# Patient Record
Sex: MALE | Race: WHITE | NOT HISPANIC OR LATINO
[De-identification: names, ages, dates, MRNs, and addresses within clinical notes are randomized per-mention and may not be internally consistent; named-entity substitution may affect disease eponyms.]

---

## 2018-07-11 ENCOUNTER — RECORD ABSTRACTING (OUTPATIENT)
Age: 58
End: 2018-07-11

## 2018-07-11 DIAGNOSIS — K58.9 IRRITABLE BOWEL SYNDROME W/OUT DIARRHEA: ICD-10-CM

## 2018-07-11 RX ORDER — ALLOPURINOL 300 MG/1
300 TABLET ORAL
Refills: 0 | Status: ACTIVE | COMMUNITY

## 2018-07-29 ENCOUNTER — RESULT CHARGE (OUTPATIENT)
Age: 58
End: 2018-07-29

## 2018-07-30 ENCOUNTER — LABORATORY RESULT (OUTPATIENT)
Age: 58
End: 2018-07-30

## 2018-07-30 ENCOUNTER — APPOINTMENT (OUTPATIENT)
Dept: PULMONOLOGY | Facility: CLINIC | Age: 58
End: 2018-07-30
Payer: COMMERCIAL

## 2018-07-30 ENCOUNTER — NON-APPOINTMENT (OUTPATIENT)
Age: 58
End: 2018-07-30

## 2018-07-30 VITALS
BODY MASS INDEX: 40.43 KG/M2 | RESPIRATION RATE: 17 BRPM | OXYGEN SATURATION: 95 % | SYSTOLIC BLOOD PRESSURE: 128 MMHG | HEIGHT: 74 IN | HEART RATE: 85 BPM | DIASTOLIC BLOOD PRESSURE: 84 MMHG | WEIGHT: 315 LBS

## 2018-07-30 DIAGNOSIS — M19.041 PRIMARY OSTEOARTHRITIS, RIGHT HAND: ICD-10-CM

## 2018-07-30 DIAGNOSIS — M19.042 PRIMARY OSTEOARTHRITIS, LEFT HAND: ICD-10-CM

## 2018-07-30 LAB
ALBUMIN: 30
BILIRUB UR QL STRIP: NORMAL
CLARITY UR: CLEAR
COLLECTION METHOD: NORMAL
CREATININE: 30
GLUCOSE UR-MCNC: NEGATIVE
HCG UR QL: 0.2 EU/DL
HGB UR QL STRIP.AUTO: NEGATIVE
KETONES UR-MCNC: NEGATIVE
LEUKOCYTE ESTERASE UR QL STRIP: NEGATIVE
MICROALBUMIN/CREAT UR TEST STR-RTO: 300
NITRITE UR QL STRIP: NEGATIVE
PH UR STRIP: 5.5
POCT - HEMOGLOBIN (HGB), QUANTITATIVE, TRANSCUTANEOUS: 15.3
PROT UR STRIP-MCNC: 30
SP GR UR STRIP: 1.03

## 2018-07-30 PROCEDURE — 99396 PREV VISIT EST AGE 40-64: CPT | Mod: 25

## 2018-07-30 PROCEDURE — 94250: CPT

## 2018-07-30 PROCEDURE — 90750 HZV VACC RECOMBINANT IM: CPT | Mod: GY

## 2018-07-30 PROCEDURE — 94727 GAS DIL/WSHOT DETER LNG VOL: CPT

## 2018-07-30 PROCEDURE — 88738 HGB QUANT TRANSCUTANEOUS: CPT

## 2018-07-30 PROCEDURE — 90471 IMMUNIZATION ADMIN: CPT | Mod: GY

## 2018-07-30 PROCEDURE — 94729 DIFFUSING CAPACITY: CPT

## 2018-07-30 PROCEDURE — 81003 URINALYSIS AUTO W/O SCOPE: CPT | Mod: QW

## 2018-07-30 PROCEDURE — 82044 UR ALBUMIN SEMIQUANTITATIVE: CPT | Mod: QW

## 2018-08-01 LAB
25(OH)D3 SERPL-MCNC: 25.9 NG/ML
ALBUMIN SERPL ELPH-MCNC: 4.6 G/DL
ALP BLD-CCNC: 67 U/L
ALT SERPL-CCNC: 51 U/L
ANION GAP SERPL CALC-SCNC: 14 MMOL/L
AST SERPL-CCNC: 26 U/L
BASOPHILS # BLD AUTO: 0.01 K/UL
BASOPHILS NFR BLD AUTO: 0.2 %
BILIRUB DIRECT SERPL-MCNC: 0.1 MG/DL
BILIRUB INDIRECT SERPL-MCNC: 0.3 MG/DL
BILIRUB SERPL-MCNC: 0.4 MG/DL
BUN SERPL-MCNC: 14 MG/DL
CALCIUM SERPL-MCNC: 9.5 MG/DL
CHLORIDE SERPL-SCNC: 104 MMOL/L
CHOLEST SERPL-MCNC: 191 MG/DL
CHOLEST/HDLC SERPL: 4.1 RATIO
CO2 SERPL-SCNC: 25 MMOL/L
CREAT SERPL-MCNC: 0.79 MG/DL
EOSINOPHIL # BLD AUTO: 0.15 K/UL
EOSINOPHIL NFR BLD AUTO: 2.7 %
GLUCOSE SERPL-MCNC: 99 MG/DL
HBA1C MFR BLD HPLC: 5.5 %
HCT VFR BLD CALC: 44.8 %
HCV AB SER QL: NONREACTIVE
HCV S/CO RATIO: 0.22 S/CO
HDLC SERPL-MCNC: 47 MG/DL
HGB BLD-MCNC: 14.4 G/DL
IMM GRANULOCYTES NFR BLD AUTO: 0.4 %
LDLC SERPL CALC-MCNC: 105 MG/DL
LYMPHOCYTES # BLD AUTO: 1.79 K/UL
LYMPHOCYTES NFR BLD AUTO: 31.9 %
MAN DIFF?: NORMAL
MCHC RBC-ENTMCNC: 31.9 PG
MCHC RBC-ENTMCNC: 32.1 GM/DL
MCV RBC AUTO: 99.3 FL
MONOCYTES # BLD AUTO: 0.38 K/UL
MONOCYTES NFR BLD AUTO: 6.8 %
NEUTROPHILS # BLD AUTO: 3.27 K/UL
NEUTROPHILS NFR BLD AUTO: 58 %
PLATELET # BLD AUTO: 222 K/UL
POTASSIUM SERPL-SCNC: 4.3 MMOL/L
PROT SERPL-MCNC: 7 G/DL
RBC # BLD: 4.51 M/UL
RBC # FLD: 13.9 %
SODIUM SERPL-SCNC: 143 MMOL/L
T3 SERPL-MCNC: 90 NG/DL
T3RU NFR SERPL: 0.97 INDEX
T4 FREE SERPL-MCNC: 1.7 NG/DL
T4 SERPL-MCNC: 8.3 UG/DL
TRIGL SERPL-MCNC: 194 MG/DL
TSH SERPL-ACNC: 0.94 UIU/ML
URATE SERPL-MCNC: 5.4 MG/DL
WBC # FLD AUTO: 5.62 K/UL

## 2018-09-28 ENCOUNTER — RX RENEWAL (OUTPATIENT)
Age: 58
End: 2018-09-28

## 2018-09-28 ENCOUNTER — RX CHANGE (OUTPATIENT)
Age: 58
End: 2018-09-28

## 2018-11-24 ENCOUNTER — RECORD ABSTRACTING (OUTPATIENT)
Age: 58
End: 2018-11-24

## 2018-11-26 ENCOUNTER — APPOINTMENT (OUTPATIENT)
Dept: PULMONOLOGY | Facility: CLINIC | Age: 58
End: 2018-11-26
Payer: COMMERCIAL

## 2018-11-26 VITALS
DIASTOLIC BLOOD PRESSURE: 90 MMHG | OXYGEN SATURATION: 95 % | RESPIRATION RATE: 12 BRPM | WEIGHT: 310 LBS | HEART RATE: 88 BPM | HEIGHT: 74 IN | TEMPERATURE: 98.1 F | BODY MASS INDEX: 39.78 KG/M2 | SYSTOLIC BLOOD PRESSURE: 136 MMHG

## 2018-11-26 VITALS — DIASTOLIC BLOOD PRESSURE: 86 MMHG | SYSTOLIC BLOOD PRESSURE: 120 MMHG

## 2018-11-26 PROCEDURE — G0008: CPT

## 2018-11-26 PROCEDURE — 90686 IIV4 VACC NO PRSV 0.5 ML IM: CPT

## 2018-11-26 PROCEDURE — 99214 OFFICE O/P EST MOD 30 MIN: CPT | Mod: 25

## 2019-01-21 ENCOUNTER — APPOINTMENT (OUTPATIENT)
Dept: PULMONOLOGY | Facility: CLINIC | Age: 59
End: 2019-01-21
Payer: COMMERCIAL

## 2019-01-21 VITALS — OXYGEN SATURATION: 95 % | HEART RATE: 92 BPM | SYSTOLIC BLOOD PRESSURE: 131 MMHG | DIASTOLIC BLOOD PRESSURE: 86 MMHG

## 2019-01-21 PROCEDURE — 90471 IMMUNIZATION ADMIN: CPT

## 2019-01-21 PROCEDURE — 90750 HZV VACC RECOMBINANT IM: CPT

## 2019-01-21 PROCEDURE — 99213 OFFICE O/P EST LOW 20 MIN: CPT | Mod: 25

## 2019-01-21 NOTE — HISTORY OF PRESENT ILLNESS
[FreeTextEntry1] : saw rheum for pain top of feet knees and, placed on Gabapentin and Mobic and see neurologist

## 2019-01-21 NOTE — REVIEW OF SYSTEMS
[Hypertension] : ~T hypertension [Back Pain] : ~T back pain [As Noted in HPI] : as noted in HPI [Negative] : Gastrointestinal [Fatigue] : no fatigue [FreeTextEntry6] : foot pain

## 2019-01-21 NOTE — PHYSICAL EXAM
[General Appearance - Well Developed] : well developed [General Appearance - Well Nourished] : well nourished [Normal Oropharynx] : normal oropharynx [Neck Appearance] : the appearance of the neck was normal [Heart Sounds] : normal S1 and S2 [Edema] : no peripheral edema present [Auscultation Breath Sounds / Voice Sounds] : lungs were clear to auscultation bilaterally [Abdomen Soft] : soft [Abdomen Tenderness] : non-tender [Abdomen Mass (___ Cm)] : no abdominal mass palpated [Nail Clubbing] : no clubbing of the fingernails [Cyanosis, Localized] : no localized cyanosis [Petechial Hemorrhages (___cm)] : no petechial hemorrhages [Skin Color & Pigmentation] : normal skin color and pigmentation [] : no rash [No Venous Stasis] : no venous stasis [Skin Lesions] : no skin lesions [No Skin Ulcers] : no skin ulcer [No Xanthoma] : no  xanthoma was observed [Deep Tendon Reflexes (DTR)] : deep tendon reflexes were 2+ and symmetric [Sensation] : the sensory exam was normal to light touch and pinprick [No Focal Deficits] : no focal deficits [Oriented To Time, Place, And Person] : oriented to person, place, and time [Impaired Insight] : insight and judgment were intact [Affect] : the affect was normal

## 2019-02-19 ENCOUNTER — RX RENEWAL (OUTPATIENT)
Age: 59
End: 2019-02-19

## 2019-04-15 ENCOUNTER — APPOINTMENT (OUTPATIENT)
Dept: PULMONOLOGY | Facility: CLINIC | Age: 59
End: 2019-04-15
Payer: COMMERCIAL

## 2019-04-15 VITALS
BODY MASS INDEX: 39.78 KG/M2 | HEIGHT: 74 IN | HEART RATE: 90 BPM | RESPIRATION RATE: 16 BRPM | WEIGHT: 310 LBS | OXYGEN SATURATION: 95 % | DIASTOLIC BLOOD PRESSURE: 84 MMHG | SYSTOLIC BLOOD PRESSURE: 126 MMHG

## 2019-04-15 PROCEDURE — 36415 COLL VENOUS BLD VENIPUNCTURE: CPT

## 2019-04-15 PROCEDURE — 99213 OFFICE O/P EST LOW 20 MIN: CPT | Mod: 25

## 2019-04-15 NOTE — PHYSICAL EXAM
[General Appearance - Well Developed] : well developed [General Appearance - Well Nourished] : well nourished [Normal Oropharynx] : normal oropharynx [Neck Appearance] : the appearance of the neck was normal [Heart Sounds] : normal S1 and S2 [Edema] : no peripheral edema present [Auscultation Breath Sounds / Voice Sounds] : lungs were clear to auscultation bilaterally [Abdomen Soft] : soft [Abdomen Tenderness] : non-tender [Abdomen Mass (___ Cm)] : no abdominal mass palpated [Nail Clubbing] : no clubbing of the fingernails [Cyanosis, Localized] : no localized cyanosis [Petechial Hemorrhages (___cm)] : no petechial hemorrhages [Skin Color & Pigmentation] : normal skin color and pigmentation [] : no rash [No Venous Stasis] : no venous stasis [Skin Lesions] : no skin lesions [No Xanthoma] : no  xanthoma was observed [No Skin Ulcers] : no skin ulcer [Deep Tendon Reflexes (DTR)] : deep tendon reflexes were 2+ and symmetric [Sensation] : the sensory exam was normal to light touch and pinprick [No Focal Deficits] : no focal deficits [Oriented To Time, Place, And Person] : oriented to person, place, and time [Impaired Insight] : insight and judgment were intact [Affect] : the affect was normal

## 2019-04-16 LAB
ALBUMIN SERPL ELPH-MCNC: 4.7 G/DL
ALP BLD-CCNC: 72 U/L
ALT SERPL-CCNC: 39 U/L
AST SERPL-CCNC: 23 U/L
BILIRUB DIRECT SERPL-MCNC: 0.1 MG/DL
BILIRUB INDIRECT SERPL-MCNC: 0.4 MG/DL
BILIRUB SERPL-MCNC: 0.5 MG/DL
CHOLEST SERPL-MCNC: 192 MG/DL
CHOLEST/HDLC SERPL: 3.9 RATIO
HDLC SERPL-MCNC: 49 MG/DL
LDLC SERPL CALC-MCNC: 97 MG/DL
PROT SERPL-MCNC: 7.2 G/DL
TRIGL SERPL-MCNC: 231 MG/DL

## 2019-06-17 ENCOUNTER — RX RENEWAL (OUTPATIENT)
Age: 59
End: 2019-06-17

## 2019-07-22 ENCOUNTER — APPOINTMENT (OUTPATIENT)
Dept: PULMONOLOGY | Facility: CLINIC | Age: 59
End: 2019-07-22

## 2019-08-12 ENCOUNTER — NON-APPOINTMENT (OUTPATIENT)
Age: 59
End: 2019-08-12

## 2019-08-12 ENCOUNTER — APPOINTMENT (OUTPATIENT)
Dept: PULMONOLOGY | Facility: CLINIC | Age: 59
End: 2019-08-12
Payer: COMMERCIAL

## 2019-08-12 ENCOUNTER — LABORATORY RESULT (OUTPATIENT)
Age: 59
End: 2019-08-12

## 2019-08-12 VITALS — DIASTOLIC BLOOD PRESSURE: 82 MMHG | SYSTOLIC BLOOD PRESSURE: 130 MMHG

## 2019-08-12 VITALS
HEIGHT: 74 IN | RESPIRATION RATE: 16 BRPM | SYSTOLIC BLOOD PRESSURE: 136 MMHG | OXYGEN SATURATION: 95 % | DIASTOLIC BLOOD PRESSURE: 90 MMHG | BODY MASS INDEX: 40.43 KG/M2 | TEMPERATURE: 98.8 F | HEART RATE: 93 BPM | WEIGHT: 315 LBS

## 2019-08-12 DIAGNOSIS — Z11.59 ENCOUNTER FOR SCREENING FOR OTHER VIRAL DISEASES: ICD-10-CM

## 2019-08-12 DIAGNOSIS — M19.90 UNSPECIFIED OSTEOARTHRITIS, UNSPECIFIED SITE: ICD-10-CM

## 2019-08-12 LAB
ALBUMIN: 30
BILIRUB UR QL STRIP: NEGATIVE
CLARITY UR: CLEAR
COLLECTION METHOD: NORMAL
CREATININE: 300
GLUCOSE UR-MCNC: NEGATIVE
HCG UR QL: 0.2 EU/DL
HGB UR QL STRIP.AUTO: NEGATIVE
KETONES UR-MCNC: NORMAL
LEUKOCYTE ESTERASE UR QL STRIP: NEGATIVE
MICROALBUMIN/CREAT UR TEST STR-RTO: 30
NITRITE UR QL STRIP: NEGATIVE
PH UR STRIP: 5
PROT UR STRIP-MCNC: NEGATIVE
SP GR UR STRIP: 1.03

## 2019-08-12 PROCEDURE — 93000 ELECTROCARDIOGRAM COMPLETE: CPT

## 2019-08-12 PROCEDURE — 82044 UR ALBUMIN SEMIQUANTITATIVE: CPT | Mod: QW

## 2019-08-12 PROCEDURE — 99396 PREV VISIT EST AGE 40-64: CPT | Mod: 25

## 2019-08-12 PROCEDURE — 94727 GAS DIL/WSHOT DETER LNG VOL: CPT

## 2019-08-12 PROCEDURE — 36415 COLL VENOUS BLD VENIPUNCTURE: CPT

## 2019-08-12 PROCEDURE — 94729 DIFFUSING CAPACITY: CPT

## 2019-08-12 PROCEDURE — 81003 URINALYSIS AUTO W/O SCOPE: CPT | Mod: QW

## 2019-08-12 PROCEDURE — 94060 EVALUATION OF WHEEZING: CPT

## 2019-08-12 RX ORDER — CHOLESTYRAMINE 4 G/5.5G
4 POWDER, FOR SUSPENSION ORAL
Refills: 0 | Status: DISCONTINUED | COMMUNITY
End: 2019-08-12

## 2019-08-12 RX ORDER — AMLODIPINE BESYLATE 5 MG/1
5 TABLET ORAL DAILY
Refills: 0 | Status: DISCONTINUED | COMMUNITY
End: 2019-08-12

## 2019-08-12 RX ORDER — MELOXICAM 15 MG/1
15 TABLET ORAL
Qty: 30 | Refills: 0 | Status: DISCONTINUED | COMMUNITY
Start: 2019-01-12 | End: 2019-08-12

## 2019-08-12 NOTE — ASSESSMENT
[FreeTextEntry1] : Weight loss recommended and discussed.\par Also will help with arthritis and hopefully decrease in NSAIA\par Labs drawn in office today\par Medications reviewed and renewed.\par

## 2019-08-12 NOTE — DISCUSSION/SUMMARY
[FreeTextEntry1] : Overweight for diet/exercise\par Asthma meets goals. Denies significant nocturnal symptoms. Rare beta agonist use. Denies significant cough, wheezing, SOB.\par Osteoarthritis seeing ortho\par Renal Stones seeing  also for prostate Lummerman\par HTN controlled\par Hyperlipidemia

## 2019-08-12 NOTE — PROCEDURE
[FreeTextEntry1] : Venipuncture for labs\par \par Pulmonary function testing.\par These data demonstrate a mild obstructive ventilatory deficit. There is no significant bronchodilator response. Normal Lung Volumes. Diffusion capacity is normal. \par

## 2019-08-12 NOTE — PHYSICAL EXAM
[General Appearance - Well Developed] : well developed [General Appearance - Well Nourished] : well nourished [Normal Oropharynx] : normal oropharynx [Neck Appearance] : the appearance of the neck was normal [Heart Sounds] : normal S1 and S2 [Edema] : no peripheral edema present [Auscultation Breath Sounds / Voice Sounds] : lungs were clear to auscultation bilaterally [Abdomen Soft] : soft [Abdomen Tenderness] : non-tender [Abdomen Mass (___ Cm)] : no abdominal mass palpated [Nail Clubbing] : no clubbing of the fingernails [Cyanosis, Localized] : no localized cyanosis [Petechial Hemorrhages (___cm)] : no petechial hemorrhages [Skin Color & Pigmentation] : normal skin color and pigmentation [] : no rash [No Venous Stasis] : no venous stasis [Skin Lesions] : no skin lesions [No Skin Ulcers] : no skin ulcer [No Xanthoma] : no  xanthoma was observed [Deep Tendon Reflexes (DTR)] : deep tendon reflexes were 2+ and symmetric [Sensation] : the sensory exam was normal to light touch and pinprick [No Focal Deficits] : no focal deficits [Oriented To Time, Place, And Person] : oriented to person, place, and time [Impaired Insight] : insight and judgment were intact [Affect] : the affect was normal [FreeTextEntry1] : Overweight

## 2019-08-12 NOTE — HISTORY OF PRESENT ILLNESS
[FreeTextEntry1] : Colonoscopy 2 year ago\par Optho yearly\par Derm early\par \par Biggest c/ is the arthritis, seeing back orthopedic and may need sx, had shots in spine. Told to lose weight.Has gained weight

## 2019-08-13 LAB
25(OH)D3 SERPL-MCNC: 35.4 NG/ML
ALBUMIN SERPL ELPH-MCNC: 4.6 G/DL
ALP BLD-CCNC: 67 U/L
ALT SERPL-CCNC: 42 U/L
ANION GAP SERPL CALC-SCNC: 20 MMOL/L
AST SERPL-CCNC: 27 U/L
BASOPHILS # BLD AUTO: 0.02 K/UL
BASOPHILS NFR BLD AUTO: 0.4 %
BILIRUB DIRECT SERPL-MCNC: 0.1 MG/DL
BILIRUB INDIRECT SERPL-MCNC: 0.3 MG/DL
BILIRUB SERPL-MCNC: 0.4 MG/DL
BUN SERPL-MCNC: 14 MG/DL
CALCIUM SERPL-MCNC: 10 MG/DL
CHLORIDE SERPL-SCNC: 103 MMOL/L
CHOLEST SERPL-MCNC: 201 MG/DL
CHOLEST/HDLC SERPL: 3.7 RATIO
CO2 SERPL-SCNC: 22 MMOL/L
CREAT SERPL-MCNC: 0.96 MG/DL
EOSINOPHIL # BLD AUTO: 0.15 K/UL
EOSINOPHIL NFR BLD AUTO: 2.8 %
ESTIMATED AVERAGE GLUCOSE: 108 MG/DL
GLUCOSE SERPL-MCNC: 106 MG/DL
HBA1C MFR BLD HPLC: 5.4 %
HCT VFR BLD CALC: 45.3 %
HCV AB SER QL: NONREACTIVE
HCV S/CO RATIO: 0.13 S/CO
HDLC SERPL-MCNC: 54 MG/DL
HGB BLD-MCNC: 15 G/DL
IMM GRANULOCYTES NFR BLD AUTO: 0.4 %
LDLC SERPL CALC-MCNC: 112 MG/DL
LYMPHOCYTES # BLD AUTO: 1.6 K/UL
LYMPHOCYTES NFR BLD AUTO: 30.1 %
MAN DIFF?: NORMAL
MCHC RBC-ENTMCNC: 32.3 PG
MCHC RBC-ENTMCNC: 33.1 GM/DL
MCV RBC AUTO: 97.6 FL
MONOCYTES # BLD AUTO: 0.42 K/UL
MONOCYTES NFR BLD AUTO: 7.9 %
NEUTROPHILS # BLD AUTO: 3.1 K/UL
NEUTROPHILS NFR BLD AUTO: 58.4 %
PLATELET # BLD AUTO: 244 K/UL
POTASSIUM SERPL-SCNC: 4.8 MMOL/L
PROT SERPL-MCNC: 7.4 G/DL
PSA SERPL-MCNC: 1.61 NG/ML
RBC # BLD: 4.64 M/UL
RBC # FLD: 13.1 %
SODIUM SERPL-SCNC: 144 MMOL/L
T3 SERPL-MCNC: 95 NG/DL
T3RU NFR SERPL: 1 TBI
T4 FREE SERPL-MCNC: 1.6 NG/DL
T4 SERPL-MCNC: 7.5 UG/DL
TRIGL SERPL-MCNC: 177 MG/DL
TSH SERPL-ACNC: 1.18 UIU/ML
WBC # FLD AUTO: 5.31 K/UL

## 2020-02-18 ENCOUNTER — RX RENEWAL (OUTPATIENT)
Age: 60
End: 2020-02-18

## 2020-03-16 ENCOUNTER — APPOINTMENT (OUTPATIENT)
Dept: PULMONOLOGY | Facility: CLINIC | Age: 60
End: 2020-03-16
Payer: COMMERCIAL

## 2020-03-16 VITALS
DIASTOLIC BLOOD PRESSURE: 79 MMHG | RESPIRATION RATE: 16 BRPM | OXYGEN SATURATION: 95 % | TEMPERATURE: 98.1 F | HEART RATE: 93 BPM | SYSTOLIC BLOOD PRESSURE: 138 MMHG

## 2020-03-16 VITALS — DIASTOLIC BLOOD PRESSURE: 78 MMHG | SYSTOLIC BLOOD PRESSURE: 134 MMHG

## 2020-03-16 PROCEDURE — 99213 OFFICE O/P EST LOW 20 MIN: CPT

## 2020-03-16 NOTE — ASSESSMENT
[FreeTextEntry1] : Weight loss recommended and discussed.\par Obtain labs\par Medications reviewed and renewed.\par

## 2020-03-16 NOTE — DISCUSSION/SUMMARY
[FreeTextEntry1] : Overweight continue diet/exercise\par Asthma meets goals. Denies significant nocturnal symptoms. Rare beta agonist use. Denies significant cough, wheezing, SOB.\par Osteoarthritis seeing ortho\par Renal Stones seeing  also for prostate Lummerman\par HTN controlled\par Hyperlipidemia

## 2020-03-16 NOTE — PHYSICAL EXAM
[No Acute Distress] : no acute distress [Normal Oropharynx] : normal oropharynx [Supple] : supple [No JVD] : no jvd [Normal S1, S2] : normal s1, s2 [No Murmurs] : no murmurs [Clear to Auscultation Bilaterally] : clear to auscultation bilaterally [Normal to Percussion] : normal to percussion [Benign] : benign [No HSM] : no hsm [No Clubbing] : no clubbing [No Cyanosis] : no cyanosis [No Edema] : no edema [Oriented x3] : oriented x3 [Normal Affect] : normal affect

## 2020-06-08 ENCOUNTER — RX RENEWAL (OUTPATIENT)
Age: 60
End: 2020-06-08

## 2020-06-17 ENCOUNTER — NON-APPOINTMENT (OUTPATIENT)
Age: 60
End: 2020-06-17

## 2020-06-17 ENCOUNTER — APPOINTMENT (OUTPATIENT)
Dept: PULMONOLOGY | Facility: CLINIC | Age: 60
End: 2020-06-17
Payer: COMMERCIAL

## 2020-06-17 VITALS
TEMPERATURE: 98.5 F | OXYGEN SATURATION: 96 % | SYSTOLIC BLOOD PRESSURE: 146 MMHG | HEART RATE: 92 BPM | RESPIRATION RATE: 16 BRPM | DIASTOLIC BLOOD PRESSURE: 83 MMHG

## 2020-06-17 DIAGNOSIS — M17.10 UNILATERAL PRIMARY OSTEOARTHRITIS, UNSPECIFIED KNEE: ICD-10-CM

## 2020-06-17 PROCEDURE — 99214 OFFICE O/P EST MOD 30 MIN: CPT | Mod: 25

## 2020-06-17 PROCEDURE — 36415 COLL VENOUS BLD VENIPUNCTURE: CPT

## 2020-06-17 PROCEDURE — 93000 ELECTROCARDIOGRAM COMPLETE: CPT

## 2020-06-17 RX ORDER — RANITIDINE HYDROCHLORIDE 150 MG/1
150 CAPSULE ORAL
Refills: 0 | Status: DISCONTINUED | COMMUNITY
Start: 2018-07-30 | End: 2020-06-17

## 2020-06-17 RX ORDER — GABAPENTIN 300 MG/1
300 CAPSULE ORAL
Qty: 90 | Refills: 0 | Status: DISCONTINUED | COMMUNITY
Start: 2019-01-12 | End: 2020-06-17

## 2020-06-17 RX ORDER — FAMOTIDINE 20 MG/1
20 TABLET, FILM COATED ORAL
Refills: 0 | Status: ACTIVE | COMMUNITY

## 2020-06-17 NOTE — PHYSICAL EXAM
[No Acute Distress] : no acute distress [Normal Oropharynx] : normal oropharynx [No JVD] : no jvd [Supple] : supple [Normal S1, S2] : normal s1, s2 [Normal to Percussion] : normal to percussion [No Murmurs] : no murmurs [Clear to Auscultation Bilaterally] : clear to auscultation bilaterally [Benign] : benign [No HSM] : no hsm [No Cyanosis] : no cyanosis [No Clubbing] : no clubbing [No Edema] : no edema [Normal Affect] : normal affect [Oriented x3] : oriented x3

## 2020-06-18 LAB
ALBUMIN SERPL ELPH-MCNC: 4.7 G/DL
ALP BLD-CCNC: 60 U/L
ALT SERPL-CCNC: 43 U/L
ANION GAP SERPL CALC-SCNC: 13 MMOL/L
APPEARANCE: CLEAR
APTT BLD: 29 SEC
AST SERPL-CCNC: 24 U/L
BACTERIA: NEGATIVE
BASOPHILS # BLD AUTO: 0.03 K/UL
BASOPHILS NFR BLD AUTO: 0.5 %
BILIRUB SERPL-MCNC: 0.5 MG/DL
BILIRUBIN URINE: NEGATIVE
BLOOD URINE: NEGATIVE
BUN SERPL-MCNC: 17 MG/DL
CALCIUM SERPL-MCNC: 9.6 MG/DL
CHLORIDE SERPL-SCNC: 100 MMOL/L
CO2 SERPL-SCNC: 25 MMOL/L
COLOR: NORMAL
CREAT SERPL-MCNC: 0.85 MG/DL
CREAT SPEC-SCNC: 184 MG/DL
EOSINOPHIL # BLD AUTO: 0.13 K/UL
EOSINOPHIL NFR BLD AUTO: 2.2 %
GLUCOSE QUALITATIVE U: NEGATIVE
GLUCOSE SERPL-MCNC: 108 MG/DL
HCT VFR BLD CALC: 45 %
HGB BLD-MCNC: 15.2 G/DL
HYALINE CASTS: 2 /LPF
IMM GRANULOCYTES NFR BLD AUTO: 0.2 %
INR PPP: 0.91 RATIO
KETONES URINE: NORMAL
LEUKOCYTE ESTERASE URINE: NEGATIVE
LYMPHOCYTES # BLD AUTO: 1.7 K/UL
LYMPHOCYTES NFR BLD AUTO: 29.3 %
MAN DIFF?: NORMAL
MCHC RBC-ENTMCNC: 33.3 PG
MCHC RBC-ENTMCNC: 33.8 GM/DL
MCV RBC AUTO: 98.7 FL
MICROALBUMIN 24H UR DL<=1MG/L-MCNC: 1.2 MG/DL
MICROALBUMIN/CREAT 24H UR-RTO: 7 MG/G
MICROSCOPIC-UA: NORMAL
MONOCYTES # BLD AUTO: 0.5 K/UL
MONOCYTES NFR BLD AUTO: 8.6 %
NEUTROPHILS # BLD AUTO: 3.43 K/UL
NEUTROPHILS NFR BLD AUTO: 59.2 %
NITRITE URINE: NEGATIVE
PH URINE: 5.5
PLATELET # BLD AUTO: 222 K/UL
POTASSIUM SERPL-SCNC: 4.7 MMOL/L
PROT SERPL-MCNC: 7.1 G/DL
PROTEIN URINE: NEGATIVE
PT BLD: 10.4 SEC
RBC # BLD: 4.56 M/UL
RBC # FLD: 12.8 %
RED BLOOD CELLS URINE: 2 /HPF
SODIUM SERPL-SCNC: 138 MMOL/L
SPECIFIC GRAVITY URINE: 1.03
SQUAMOUS EPITHELIAL CELLS: 0 /HPF
UROBILINOGEN URINE: NORMAL
WBC # FLD AUTO: 5.8 K/UL
WHITE BLOOD CELLS URINE: 1 /HPF

## 2020-06-18 NOTE — CONSULT LETTER
[Dear  ___] : Dear ~LAUREANO, [Consult Letter:] : I had the pleasure of evaluating your patient, [unfilled]. [Please see my note below.] : Please see my note below. [Sincerely,] : Sincerely, [Consult Closing:] : Thank you very much for allowing me to participate in the care of this patient.  If you have any questions, please do not hesitate to contact me. [FreeTextEntry2] : Joe Rodriguez MD [FreeTextEntry3] : Ankit Singer MD FCCP\par

## 2020-06-18 NOTE — DISCUSSION/SUMMARY
[FreeTextEntry1] : Overweight continue diet/exercise\par Asthma meets goals. Denies significant nocturnal symptoms. Rare beta agonist use. Denies significant cough, wheezing, SOB.\par Osteoarthritis for knee replacement\par Renal Stones seeing  also for prostate Dr. Holman\par HTN controlled\par Hyperlipidemia\par IVY could not tolerate CPAP

## 2020-06-18 NOTE — ASSESSMENT
[FreeTextEntry1] : Pt with IVY. Anesthesia should be aware of IVY and take IVY precautions.\par \par Medical problems as above. Medical status maximized. No medical contraindications to surgery.\par

## 2020-06-18 NOTE — HISTORY OF PRESENT ILLNESS
[Never] : never [TextBox_4] : Preop left total knee replacement on 7/14.\par [facility]\par DR. Joe Rodriguez\par \par For medical clearance.\par \par No significant cough, wheezing, chest pain or SOB.\par Had ST 2/20 told negative. \par \par Not compliant to CPAP

## 2020-06-19 LAB — BACTERIA UR CULT: NORMAL

## 2020-08-24 ENCOUNTER — LABORATORY RESULT (OUTPATIENT)
Age: 60
End: 2020-08-24

## 2020-08-24 ENCOUNTER — APPOINTMENT (OUTPATIENT)
Dept: PULMONOLOGY | Facility: CLINIC | Age: 60
End: 2020-08-24
Payer: COMMERCIAL

## 2020-08-24 VITALS
HEIGHT: 74 IN | HEART RATE: 96 BPM | TEMPERATURE: 97.3 F | OXYGEN SATURATION: 95 % | RESPIRATION RATE: 16 BRPM | BODY MASS INDEX: 40.43 KG/M2 | DIASTOLIC BLOOD PRESSURE: 81 MMHG | SYSTOLIC BLOOD PRESSURE: 123 MMHG | WEIGHT: 315 LBS

## 2020-08-24 PROCEDURE — 36415 COLL VENOUS BLD VENIPUNCTURE: CPT

## 2020-08-24 PROCEDURE — 82044 UR ALBUMIN SEMIQUANTITATIVE: CPT | Mod: QW

## 2020-08-24 PROCEDURE — 99396 PREV VISIT EST AGE 40-64: CPT | Mod: 25

## 2020-08-24 PROCEDURE — 81003 URINALYSIS AUTO W/O SCOPE: CPT | Mod: QW

## 2020-08-24 RX ORDER — MONTELUKAST SODIUM 10 MG/1
10 TABLET, FILM COATED ORAL
Refills: 0 | Status: DISCONTINUED | COMMUNITY
Start: 2018-07-30 | End: 2020-08-24

## 2020-08-24 NOTE — DISCUSSION/SUMMARY
[FreeTextEntry1] : Overweight for diet/exercise\par Asthma meets goals. Denies significant nocturnal symptoms. Rare beta agonist use. Denies significant cough, wheezing, SOB.\par Osteoarthritis\par Renal Stones seeing  also for prostate Lummerman\par HTN controlled\par Hyperlipidemia\par Status post knee replacement doing well.

## 2020-08-24 NOTE — ASSESSMENT
[FreeTextEntry1] : Medications reviewed and renewed.\par Labs drawn in office today\par Follow-up in 6 months or sooner on a PRN basis.\par Orthopedic follow-up

## 2020-08-24 NOTE — PHYSICAL EXAM
[No Acute Distress] : no acute distress [Normal Oropharynx] : normal oropharynx [Supple] : supple [Normal S1, S2] : normal s1, s2 [No JVD] : no jvd [Clear to Auscultation Bilaterally] : clear to auscultation bilaterally [No Murmurs] : no murmurs [Normal to Percussion] : normal to percussion [Benign] : benign [No HSM] : no hsm [No Clubbing] : no clubbing [No Cyanosis] : no cyanosis [Oriented x3] : oriented x3 [Normal Affect] : normal affect [TextBox_105] : Mild swelling left knee and left leg.

## 2020-08-24 NOTE — HISTORY OF PRESENT ILLNESS
[TextBox_4] : Colonoscopy 3 years\par Optho Y\par Derm Y\par \par Seeing  and allergist\par To stop allergy shots\par \par Seeing rheum for pain on top feet. Told osteoarthritis.\par \par Status post knee replacement doing relatively well.  Still receiving physical therapy.

## 2020-08-25 LAB
25(OH)D3 SERPL-MCNC: 37.1 NG/ML
ALBUMIN SERPL ELPH-MCNC: 4.8 G/DL
ALP BLD-CCNC: 87 U/L
ALT SERPL-CCNC: 36 U/L
ANION GAP SERPL CALC-SCNC: 14 MMOL/L
AST SERPL-CCNC: 21 U/L
BASOPHILS # BLD AUTO: 0.04 K/UL
BASOPHILS NFR BLD AUTO: 0.7 %
BILIRUB DIRECT SERPL-MCNC: 0.1 MG/DL
BILIRUB INDIRECT SERPL-MCNC: 0.3 MG/DL
BILIRUB SERPL-MCNC: 0.4 MG/DL
BUN SERPL-MCNC: 15 MG/DL
CALCIUM SERPL-MCNC: 10.3 MG/DL
CHLORIDE SERPL-SCNC: 101 MMOL/L
CHOLEST SERPL-MCNC: 203 MG/DL
CHOLEST/HDLC SERPL: 4.1 RATIO
CO2 SERPL-SCNC: 26 MMOL/L
CREAT SERPL-MCNC: 0.86 MG/DL
EOSINOPHIL # BLD AUTO: 0.17 K/UL
EOSINOPHIL NFR BLD AUTO: 3.2 %
ESTIMATED AVERAGE GLUCOSE: 97 MG/DL
GLUCOSE SERPL-MCNC: 103 MG/DL
HBA1C MFR BLD HPLC: 5 %
HCT VFR BLD CALC: 43.7 %
HDLC SERPL-MCNC: 50 MG/DL
HGB BLD-MCNC: 13.8 G/DL
IMM GRANULOCYTES NFR BLD AUTO: 0.2 %
LDLC SERPL CALC-MCNC: 121 MG/DL
LYMPHOCYTES # BLD AUTO: 1.46 K/UL
LYMPHOCYTES NFR BLD AUTO: 27.1 %
MAN DIFF?: NORMAL
MCHC RBC-ENTMCNC: 31.6 GM/DL
MCHC RBC-ENTMCNC: 31.9 PG
MCV RBC AUTO: 100.9 FL
MONOCYTES # BLD AUTO: 0.41 K/UL
MONOCYTES NFR BLD AUTO: 7.6 %
NEUTROPHILS # BLD AUTO: 3.3 K/UL
NEUTROPHILS NFR BLD AUTO: 61.2 %
PLATELET # BLD AUTO: 270 K/UL
POTASSIUM SERPL-SCNC: 4.6 MMOL/L
PROT SERPL-MCNC: 7.1 G/DL
RBC # BLD: 4.33 M/UL
RBC # FLD: 13 %
SODIUM SERPL-SCNC: 140 MMOL/L
T3 SERPL-MCNC: 93 NG/DL
T3RU NFR SERPL: 1 TBI
T4 FREE SERPL-MCNC: 1.7 NG/DL
T4 SERPL-MCNC: 8.1 UG/DL
TRIGL SERPL-MCNC: 160 MG/DL
TSH SERPL-ACNC: 0.92 UIU/ML
WBC # FLD AUTO: 5.39 K/UL

## 2020-10-27 ENCOUNTER — RX RENEWAL (OUTPATIENT)
Age: 60
End: 2020-10-27

## 2021-03-01 ENCOUNTER — APPOINTMENT (OUTPATIENT)
Dept: PULMONOLOGY | Facility: CLINIC | Age: 61
End: 2021-03-01
Payer: COMMERCIAL

## 2021-03-01 VITALS
BODY MASS INDEX: 40.43 KG/M2 | SYSTOLIC BLOOD PRESSURE: 144 MMHG | DIASTOLIC BLOOD PRESSURE: 93 MMHG | OXYGEN SATURATION: 93 % | HEIGHT: 74 IN | HEART RATE: 98 BPM | TEMPERATURE: 97.3 F | WEIGHT: 315 LBS

## 2021-03-01 VITALS — SYSTOLIC BLOOD PRESSURE: 130 MMHG | DIASTOLIC BLOOD PRESSURE: 82 MMHG

## 2021-03-01 DIAGNOSIS — M79.89 OTHER SPECIFIED SOFT TISSUE DISORDERS: ICD-10-CM

## 2021-03-01 PROCEDURE — 99072 ADDL SUPL MATRL&STAF TM PHE: CPT

## 2021-03-01 PROCEDURE — 36415 COLL VENOUS BLD VENIPUNCTURE: CPT

## 2021-03-01 PROCEDURE — 99214 OFFICE O/P EST MOD 30 MIN: CPT | Mod: 25

## 2021-03-01 RX ORDER — QUINAPRIL AND HYDROCHLOROTHIAZIDE 25; 20 MG/1; MG/1
20-25 TABLET ORAL
Qty: 90 | Refills: 3 | Status: DISCONTINUED | COMMUNITY
Start: 2019-02-19 | End: 2021-03-01

## 2021-03-01 NOTE — PHYSICAL EXAM
[No Acute Distress] : no acute distress [Normal Oropharynx] : normal oropharynx [Supple] : supple [No JVD] : no jvd [Normal S1, S2] : normal s1, s2 [No Murmurs] : no murmurs [Clear to Auscultation Bilaterally] : clear to auscultation bilaterally [Normal to Percussion] : normal to percussion [Benign] : benign [No HSM] : no hsm [No Clubbing] : no clubbing [No Cyanosis] : no cyanosis [Oriented x3] : oriented x3 [Normal Affect] : normal affect [TextBox_105] : Mild swelling left knee and left leg.

## 2021-03-01 NOTE — ASSESSMENT
[FreeTextEntry1] : Diuretic to Aldactazide.\par Continue quinapril and low-dose amlodipine.\par May need to change amlodipine if edema persists.\par Venous Dopplers.\par F/U 1 month.\par \par

## 2021-03-03 LAB
ALBUMIN SERPL ELPH-MCNC: 4.7 G/DL
ALP BLD-CCNC: 77 U/L
ALT SERPL-CCNC: 35 U/L
ANION GAP SERPL CALC-SCNC: 12 MMOL/L
AST SERPL-CCNC: 26 U/L
BILIRUB SERPL-MCNC: 0.4 MG/DL
BUN SERPL-MCNC: 16 MG/DL
CALCIUM SERPL-MCNC: 9.8 MG/DL
CHLORIDE SERPL-SCNC: 101 MMOL/L
CHOLEST SERPL-MCNC: 212 MG/DL
CO2 SERPL-SCNC: 27 MMOL/L
CREAT SERPL-MCNC: 0.98 MG/DL
GLUCOSE SERPL-MCNC: 105 MG/DL
HDLC SERPL-MCNC: 50 MG/DL
LDLC SERPL CALC-MCNC: 113 MG/DL
NONHDLC SERPL-MCNC: 163 MG/DL
POTASSIUM SERPL-SCNC: 4.8 MMOL/L
PROT SERPL-MCNC: 7.3 G/DL
SODIUM SERPL-SCNC: 140 MMOL/L
TRIGL SERPL-MCNC: 246 MG/DL

## 2021-03-29 ENCOUNTER — APPOINTMENT (OUTPATIENT)
Dept: PULMONOLOGY | Facility: CLINIC | Age: 61
End: 2021-03-29
Payer: COMMERCIAL

## 2021-03-29 ENCOUNTER — TRANSCRIPTION ENCOUNTER (OUTPATIENT)
Age: 61
End: 2021-03-29

## 2021-03-29 VITALS
OXYGEN SATURATION: 95 % | RESPIRATION RATE: 16 BRPM | DIASTOLIC BLOOD PRESSURE: 76 MMHG | HEART RATE: 90 BPM | TEMPERATURE: 97.8 F | SYSTOLIC BLOOD PRESSURE: 118 MMHG

## 2021-03-29 VITALS — DIASTOLIC BLOOD PRESSURE: 70 MMHG | SYSTOLIC BLOOD PRESSURE: 120 MMHG

## 2021-03-29 PROCEDURE — 99213 OFFICE O/P EST LOW 20 MIN: CPT

## 2021-03-29 PROCEDURE — 99072 ADDL SUPL MATRL&STAF TM PHE: CPT

## 2021-03-29 NOTE — ASSESSMENT
[FreeTextEntry1] : cont  Aldactazide.\par Continue quinapril and low-dose amlodipine.\par Weight loss recommended and discussed.\par \par \par \par

## 2021-03-29 NOTE — DISCUSSION/SUMMARY
[FreeTextEntry1] : Overweight for diet/exercise\par Asthma meets goals. Denies significant nocturnal symptoms. Rare beta agonist use. Denies significant cough, wheezing, SOB.\par Osteoarthritis\par Renal Stones seeing  also for prostate Lummerman\par HTN with mild elevation in blood pressure.\par Hyperlipidemia\par Status post knee replacement \par Bilat leg swelling left greater than right improved

## 2021-03-30 LAB
ANION GAP SERPL CALC-SCNC: 13 MMOL/L
BUN SERPL-MCNC: 17 MG/DL
CALCIUM SERPL-MCNC: 9.6 MG/DL
CHLORIDE SERPL-SCNC: 102 MMOL/L
CO2 SERPL-SCNC: 23 MMOL/L
CREAT SERPL-MCNC: 0.87 MG/DL
GLUCOSE SERPL-MCNC: 110 MG/DL
POTASSIUM SERPL-SCNC: 4.4 MMOL/L
SODIUM SERPL-SCNC: 139 MMOL/L

## 2021-07-02 DIAGNOSIS — R21 RASH AND OTHER NONSPECIFIC SKIN ERUPTION: ICD-10-CM

## 2021-07-15 ENCOUNTER — APPOINTMENT (OUTPATIENT)
Dept: SURGERY | Facility: CLINIC | Age: 61
End: 2021-07-15
Payer: COMMERCIAL

## 2021-07-15 VITALS
RESPIRATION RATE: 17 BRPM | DIASTOLIC BLOOD PRESSURE: 85 MMHG | HEIGHT: 74 IN | SYSTOLIC BLOOD PRESSURE: 129 MMHG | BODY MASS INDEX: 40.43 KG/M2 | HEART RATE: 97 BPM | WEIGHT: 315 LBS | OXYGEN SATURATION: 95 % | TEMPERATURE: 97.8 F

## 2021-07-15 DIAGNOSIS — K42.0 UMBILICAL HERNIA WITH OBSTRUCTION, W/OUT GANGRENE: ICD-10-CM

## 2021-07-15 PROCEDURE — 99244 OFF/OP CNSLTJ NEW/EST MOD 40: CPT

## 2021-07-15 PROCEDURE — 99072 ADDL SUPL MATRL&STAF TM PHE: CPT

## 2021-07-15 NOTE — HISTORY OF PRESENT ILLNESS
[de-identified] : This 60-year-old patient underwent a laparoscopic cholecystectomy several years ago.  About 10 months ago he developed a bulge in the umbilicus which has gotten bigger over the last several months.  He complains of mild pain from this hernia.  He denies any change in bowel or urinary habits associated with this hernia.  The hernia does not reduce spontaneously when he is in the supine position.  He also complains of a small lipoma on the right lower back.  It is mildly painful

## 2021-07-15 NOTE — PHYSICAL EXAM
[Normal Breath Sounds] : Normal breath sounds [Normal Heart Sounds] : normal heart sounds [Calm] : calm [de-identified] : No acute distress [de-identified] : Morbidly obese soft and nontender.  There is a nonreducible umbilical hernia which is mildly tender.  I am unable to appreciate the dimensions of the defect due to his body habitus [de-identified] : No clubbing cyanosis or edema [de-identified] : There is a very small perhaps 2 x 2 cm mass in the right lower back which is very difficult to palpate in the subcutaneous tissues. [de-identified] : Radial nerves II through XII grossly intact

## 2021-07-15 NOTE — PLAN
[FreeTextEntry1] : This patient is suffering from an incarcerated incisional hernia.  I have offered him repair of this hernia.  He understands that he is at risk for hernia recurrence given his obesity.  Please advise me on the safety of general anesthesia for this patient.\par \par Because of the logistics involved in going from the supine to prone position in the operating room with this patient, I will address his lipoma at a different setting

## 2021-07-16 ENCOUNTER — TRANSCRIPTION ENCOUNTER (OUTPATIENT)
Age: 61
End: 2021-07-16

## 2021-07-16 RX ORDER — HYDROCORTISONE ACETATE, PRAMOXINE HCL 2.5; 1 G/100G; G/100G
2.5-1 CREAM TOPICAL 3 TIMES DAILY
Qty: 3 | Refills: 5 | Status: ACTIVE | COMMUNITY
Start: 2021-07-16 | End: 1900-01-01

## 2021-07-16 RX ORDER — HYDROCORTISONE 25 MG/G
2.5 CREAM TOPICAL 3 TIMES DAILY
Qty: 1 | Refills: 1 | Status: DISCONTINUED | COMMUNITY
Start: 2021-07-02 | End: 2021-07-16

## 2021-07-23 ENCOUNTER — NON-APPOINTMENT (OUTPATIENT)
Age: 61
End: 2021-07-23

## 2021-08-02 ENCOUNTER — OUTPATIENT (OUTPATIENT)
Dept: OUTPATIENT SERVICES | Facility: HOSPITAL | Age: 61
LOS: 1 days | End: 2021-08-02
Payer: COMMERCIAL

## 2021-08-02 ENCOUNTER — RX RENEWAL (OUTPATIENT)
Age: 61
End: 2021-08-02

## 2021-08-02 VITALS
RESPIRATION RATE: 16 BRPM | SYSTOLIC BLOOD PRESSURE: 126 MMHG | HEART RATE: 92 BPM | DIASTOLIC BLOOD PRESSURE: 83 MMHG | WEIGHT: 315 LBS | OXYGEN SATURATION: 97 % | HEIGHT: 74 IN | TEMPERATURE: 99 F

## 2021-08-02 DIAGNOSIS — K58.9 IRRITABLE BOWEL SYNDROME WITHOUT DIARRHEA: ICD-10-CM

## 2021-08-02 DIAGNOSIS — G47.33 OBSTRUCTIVE SLEEP APNEA (ADULT) (PEDIATRIC): ICD-10-CM

## 2021-08-02 DIAGNOSIS — Z96.659 PRESENCE OF UNSPECIFIED ARTIFICIAL KNEE JOINT: Chronic | ICD-10-CM

## 2021-08-02 DIAGNOSIS — I10 ESSENTIAL (PRIMARY) HYPERTENSION: ICD-10-CM

## 2021-08-02 DIAGNOSIS — Z01.818 ENCOUNTER FOR OTHER PREPROCEDURAL EXAMINATION: ICD-10-CM

## 2021-08-02 DIAGNOSIS — Z90.49 ACQUIRED ABSENCE OF OTHER SPECIFIED PARTS OF DIGESTIVE TRACT: Chronic | ICD-10-CM

## 2021-08-02 DIAGNOSIS — K42.0 UMBILICAL HERNIA WITH OBSTRUCTION, WITHOUT GANGRENE: ICD-10-CM

## 2021-08-02 DIAGNOSIS — E66.01 MORBID (SEVERE) OBESITY DUE TO EXCESS CALORIES: ICD-10-CM

## 2021-08-02 LAB
ANION GAP SERPL CALC-SCNC: 5 MMOL/L — SIGNIFICANT CHANGE UP (ref 5–17)
BUN SERPL-MCNC: 20 MG/DL — SIGNIFICANT CHANGE UP (ref 7–23)
CALCIUM SERPL-MCNC: 9.4 MG/DL — SIGNIFICANT CHANGE UP (ref 8.4–10.5)
CHLORIDE SERPL-SCNC: 105 MMOL/L — SIGNIFICANT CHANGE UP (ref 96–108)
CO2 SERPL-SCNC: 30 MMOL/L — SIGNIFICANT CHANGE UP (ref 22–31)
CREAT SERPL-MCNC: 1.06 MG/DL — SIGNIFICANT CHANGE UP (ref 0.5–1.3)
GLUCOSE SERPL-MCNC: 105 MG/DL — HIGH (ref 70–99)
HCT VFR BLD CALC: 41.5 % — SIGNIFICANT CHANGE UP (ref 39–50)
HGB BLD-MCNC: 14.3 G/DL — SIGNIFICANT CHANGE UP (ref 13–17)
MCHC RBC-ENTMCNC: 32.9 PG — SIGNIFICANT CHANGE UP (ref 27–34)
MCHC RBC-ENTMCNC: 34.5 GM/DL — SIGNIFICANT CHANGE UP (ref 32–36)
MCV RBC AUTO: 95.4 FL — SIGNIFICANT CHANGE UP (ref 80–100)
NRBC # BLD: 0 /100 WBCS — SIGNIFICANT CHANGE UP (ref 0–0)
PLATELET # BLD AUTO: 235 K/UL — SIGNIFICANT CHANGE UP (ref 150–400)
POTASSIUM SERPL-MCNC: 4.3 MMOL/L — SIGNIFICANT CHANGE UP (ref 3.5–5.3)
POTASSIUM SERPL-SCNC: 4.3 MMOL/L — SIGNIFICANT CHANGE UP (ref 3.5–5.3)
RBC # BLD: 4.35 M/UL — SIGNIFICANT CHANGE UP (ref 4.2–5.8)
RBC # FLD: 12.4 % — SIGNIFICANT CHANGE UP (ref 10.3–14.5)
SODIUM SERPL-SCNC: 140 MMOL/L — SIGNIFICANT CHANGE UP (ref 135–145)
WBC # BLD: 6.73 K/UL — SIGNIFICANT CHANGE UP (ref 3.8–10.5)
WBC # FLD AUTO: 6.73 K/UL — SIGNIFICANT CHANGE UP (ref 3.8–10.5)

## 2021-08-02 PROCEDURE — 80048 BASIC METABOLIC PNL TOTAL CA: CPT

## 2021-08-02 PROCEDURE — 93005 ELECTROCARDIOGRAM TRACING: CPT

## 2021-08-02 PROCEDURE — 36415 COLL VENOUS BLD VENIPUNCTURE: CPT

## 2021-08-02 PROCEDURE — 93010 ELECTROCARDIOGRAM REPORT: CPT | Mod: NC

## 2021-08-02 PROCEDURE — G0463: CPT

## 2021-08-02 PROCEDURE — 85027 COMPLETE CBC AUTOMATED: CPT

## 2021-08-02 NOTE — H&P PST ADULT - NEGATIVE ENMT SYMPTOMS
no hearing difficulty/no ear pain/no tinnitus/no vertigo/no nasal congestion/no throat pain/no dysphagia

## 2021-08-02 NOTE — H&P PST ADULT - HISTORY OF PRESENT ILLNESS
61 y/o  male with a pre-opeative diagnosis of umbilical hernia.  PMH: Asthma, HTN, IBS, Sleep apnea, GERD, HLD, OA.  Patient denies h/o intubations/hospitalization for asthma.  Regarding his hernia, patient noticed a umbilical hernia about 8 months ago.  He then went to the surgeon for evaluation.  Umbilical hernia repair advised as patient was having discomfort with activities.  Otherwise patient feels well today.  Denies any complaints.   61 y/o  male with a pre-opeative diagnosis of umbilical hernia.  PMH: Asthma, HTN, IBS, Sleep apnea, GERD, HLD, OA.  Patient denies h/o intubations/hospitalization for asthma.  Regarding his hernia, patient noticed a umbilical hernia about 8 months ago.  He then went to the surgeon for evaluation.  Umbilical hernia repair advised as patient was having discomfort with activities.  s/p knee replacement in 2020, and was told by orthopedic surgeon, he should have pre-procedural antibiotics.  According to patient, Dr. Hale aware and will be given a dose of abx before surgery.   Otherwise patient feels well today.  Denies any complaints.

## 2021-08-02 NOTE — H&P PST ADULT - NSICDXPASTSURGICALHX_GEN_ALL_CORE_FT
PAST SURGICAL HISTORY:  History of cholecystectomy     S/P Colonoscopy S/P Sigmoidoscopy - multiple    S/P knee replacement left knee    Sinus Polyp & cysts removed- 2004    Vasectomy Status 1993

## 2021-08-02 NOTE — H&P PST ADULT - NSICDXPASTMEDICALHX_GEN_ALL_CORE_FT
PAST MEDICAL HISTORY:  Asthma Controlled    Chronic ethmoidal sinusitis     Chronic sphenoidal sinusitis     HLD (hyperlipidemia)     Hypertension     IBS (Irritable Bowel Syndrome)     Lyme disease 2015    MVP (mitral valve prolapse)     Obesity, Morbid     Obstructed umbilical hernia     Poison Ivy reaction in early June & was treated.    Sleep Apnea Mild -no cpap mask

## 2021-08-02 NOTE — H&P PST ADULT - NSICDXFAMILYHX_GEN_ALL_CORE_FT
FAMILY HISTORY:  Father  Still living? No  Family history of early CAD, Age at diagnosis: Age Unknown    Mother  Still living? No  FH: lung cancer, Age at diagnosis: Age Unknown

## 2021-08-02 NOTE — H&P PST ADULT - NSICDXPROBLEM_GEN_ALL_CORE_FT
PROBLEM DIAGNOSES  Problem: Obstructed umbilical hernia  Assessment and Plan: Patient provided with pre-operative instructions and verbalized understanding.  Patient can take Sprinolactone/hctz, quniapril/hctz, famotidine and prevalite, but otherwise will be NPO on day of surgery. Patient will stop NSAIDs, aspirin, herbal supplements or vitamins 1 week prior to surgery.  Chlorohexadine wash provided with instructions. IS provided with instructions.  Pending medical clearance as per surgeon    Problem: IVY (obstructive sleep apnea)  Assessment and Plan: does not use CPAP    Problem: HTN (hypertension)  Assessment and Plan: continue medications    Problem: Morbidly obese  Assessment and Plan:     Problem: IBS (irritable bowel syndrome)  Assessment and Plan: continue medications

## 2021-08-03 ENCOUNTER — APPOINTMENT (OUTPATIENT)
Dept: PULMONOLOGY | Facility: CLINIC | Age: 61
End: 2021-08-03
Payer: COMMERCIAL

## 2021-08-03 VITALS
DIASTOLIC BLOOD PRESSURE: 84 MMHG | OXYGEN SATURATION: 95 % | SYSTOLIC BLOOD PRESSURE: 128 MMHG | TEMPERATURE: 97.4 F | HEART RATE: 92 BPM

## 2021-08-03 DIAGNOSIS — Z20.822 CONTACT WITH AND (SUSPECTED) EXPOSURE TO COVID-19: ICD-10-CM

## 2021-08-03 PROCEDURE — 99214 OFFICE O/P EST MOD 30 MIN: CPT

## 2021-08-04 LAB — SARS-COV-2 N GENE NPH QL NAA+PROBE: NOT DETECTED

## 2021-08-05 ENCOUNTER — TRANSCRIPTION ENCOUNTER (OUTPATIENT)
Age: 61
End: 2021-08-05

## 2021-08-06 ENCOUNTER — RESULT REVIEW (OUTPATIENT)
Age: 61
End: 2021-08-06

## 2021-08-06 ENCOUNTER — APPOINTMENT (OUTPATIENT)
Dept: SURGERY | Facility: HOSPITAL | Age: 61
End: 2021-08-06
Payer: COMMERCIAL

## 2021-08-06 ENCOUNTER — OUTPATIENT (OUTPATIENT)
Dept: OUTPATIENT SERVICES | Facility: HOSPITAL | Age: 61
LOS: 1 days | End: 2021-08-06
Payer: COMMERCIAL

## 2021-08-06 VITALS
HEIGHT: 74 IN | OXYGEN SATURATION: 96 % | TEMPERATURE: 98 F | DIASTOLIC BLOOD PRESSURE: 81 MMHG | RESPIRATION RATE: 18 BRPM | WEIGHT: 315 LBS | SYSTOLIC BLOOD PRESSURE: 124 MMHG | HEART RATE: 93 BPM

## 2021-08-06 VITALS
RESPIRATION RATE: 18 BRPM | HEART RATE: 92 BPM | DIASTOLIC BLOOD PRESSURE: 74 MMHG | OXYGEN SATURATION: 95 % | SYSTOLIC BLOOD PRESSURE: 122 MMHG | TEMPERATURE: 99 F

## 2021-08-06 DIAGNOSIS — K42.0 UMBILICAL HERNIA WITH OBSTRUCTION, WITHOUT GANGRENE: ICD-10-CM

## 2021-08-06 DIAGNOSIS — Z90.49 ACQUIRED ABSENCE OF OTHER SPECIFIED PARTS OF DIGESTIVE TRACT: Chronic | ICD-10-CM

## 2021-08-06 DIAGNOSIS — Z96.659 PRESENCE OF UNSPECIFIED ARTIFICIAL KNEE JOINT: Chronic | ICD-10-CM

## 2021-08-06 DIAGNOSIS — K42.9 UMBILICAL HERNIA WITHOUT OBSTRUCTION OR GANGRENE: ICD-10-CM

## 2021-08-06 PROCEDURE — 49568: CPT

## 2021-08-06 PROCEDURE — 49568: CPT | Mod: 82

## 2021-08-06 PROCEDURE — 88302 TISSUE EXAM BY PATHOLOGIST: CPT | Mod: 26

## 2021-08-06 PROCEDURE — 49561: CPT

## 2021-08-06 PROCEDURE — 49561: CPT | Mod: 82

## 2021-08-06 PROCEDURE — C1781: CPT

## 2021-08-06 PROCEDURE — 88302 TISSUE EXAM BY PATHOLOGIST: CPT

## 2021-08-06 RX ORDER — OXYCODONE HYDROCHLORIDE 5 MG/1
1 TABLET ORAL
Qty: 6 | Refills: 0
Start: 2021-08-06 | End: 2021-08-07

## 2021-08-06 RX ORDER — SODIUM CHLORIDE 9 MG/ML
1000 INJECTION, SOLUTION INTRAVENOUS
Refills: 0 | Status: DISCONTINUED | OUTPATIENT
Start: 2021-08-06 | End: 2021-08-06

## 2021-08-06 RX ORDER — HYDROMORPHONE HYDROCHLORIDE 2 MG/ML
0.5 INJECTION INTRAMUSCULAR; INTRAVENOUS; SUBCUTANEOUS
Refills: 0 | Status: DISCONTINUED | OUTPATIENT
Start: 2021-08-06 | End: 2021-08-06

## 2021-08-06 RX ORDER — OXYCODONE HYDROCHLORIDE 5 MG/1
5 TABLET ORAL ONCE
Refills: 0 | Status: DISCONTINUED | OUTPATIENT
Start: 2021-08-06 | End: 2021-08-06

## 2021-08-06 RX ORDER — QUINAPRIL HYDROCHLORIDE AND HYDROCHLOROTHIAZIDE 20; 12.5 MG/1; MG/1
1 TABLET ORAL
Qty: 0 | Refills: 0 | DISCHARGE

## 2021-08-06 RX ORDER — ONDANSETRON 8 MG/1
4 TABLET, FILM COATED ORAL ONCE
Refills: 0 | Status: DISCONTINUED | OUTPATIENT
Start: 2021-08-06 | End: 2021-08-06

## 2021-08-06 NOTE — ASU PATIENT PROFILE, ADULT - PMH
Asthma  Controlled  Chronic ethmoidal sinusitis    Chronic sphenoidal sinusitis    HLD (hyperlipidemia)    Hypertension    IBS (Irritable Bowel Syndrome)    Lyme disease  2015  MVP (mitral valve prolapse)    Obesity, Morbid    Obstructed umbilical hernia    Poison Ivy  reaction in early June & was treated.  Sleep Apnea  Mild -no cpap mask

## 2021-08-06 NOTE — BRIEF OPERATIVE NOTE - NSICDXBRIEFPROCEDURE_GEN_ALL_CORE_FT
PROCEDURES:  Repair, hernia, umbilical, adult 06-Aug-2021 09:43:16  Kia Miller A   PROCEDURES:  Repair, hernia, umbilical, adult 06-Aug-2021 09:43:16 with mesh Kia Miller

## 2021-08-06 NOTE — ASU DISCHARGE PLAN (ADULT/PEDIATRIC) - CARE PROVIDER_API CALL
Porfirio Hale)  Surgery  [address], [address]  [place] [address]  Phone: [phone]  Fax: [phone]  Follow Up Time: 1 week

## 2021-08-06 NOTE — ASU PATIENT PROFILE, ADULT - PSH
History of cholecystectomy    S/P Colonoscopy  S/P Sigmoidoscopy - multiple  S/P knee replacement  left knee  Sinus Polyp  & cysts removed- 2004  Vasectomy Status  1993

## 2021-08-06 NOTE — ASU PATIENT PROFILE, ADULT - VISION (WITH CORRECTIVE LENSES IF THE PATIENT USUALLY WEARS THEM):
readers/Normal vision: sees adequately in most situations; can see medication labels, newsprint readers- left home/Normal vision: sees adequately in most situations; can see medication labels, newsprint

## 2021-08-06 NOTE — ASSESSMENT
[FreeTextEntry1] : Awaiting preoperative testing.\par \par Addendum: 8/6/21\par Labs and EKG reviewed.\par Medical problems as above. Medical status maximized. No medical contraindications to surgery.\par \par \par \par \par \par \par

## 2021-08-06 NOTE — ASU PREOP CHECKLIST - ALLERGIES REVIEWED
done Brow Lift Text: A midfrontal incision was made medially to the defect to allow access to the tissues just superior to the left eyebrow. Following careful dissection inferiorly in a supraperiosteal plane to the level of the left eyebrow, several 3-0 monocryl sutures were used to resuspend the eyebrow orbicularis oculi muscular unit to the superior frontal bone periosteum. This resulted in an appropriate reapproximation of static eyebrow symmetry and correction of the left brow ptosis.

## 2021-08-06 NOTE — ASU PATIENT PROFILE, ADULT - URINARY CATHETER
Clinic Visit Summary    2020      BART VAUGHAN Description:  Male :  1934   MRN: [id number] Provider:  Ema José M.D.    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  Hodgkin lymphoma, unspecified, lymph nodes of multiple sites [ICD10] C81.98  Anemia, unspecified [ICD10] D64.9    Vitals (last recorded)  Heading     Test Name B/P P Weight (lb) (lb) Weight (kg) (kg) T (C)   2020 1:44 /78 61 192.35 87.25 36.3     Allergies (as reported by patient)  Allergy Type Name Reaction     No Known Drug Allergies        Current Medications (as reported by patient)  Drug Name Dose Strength Route   Daily Multivitamin [ca-cj-GI-D3-lycopene-lut-coQ10] 1 tab   PO   folic acid 1 mg   PO   tamsulosin 0.4 mg   Oral   Toprol XL** (Metoprolol Succinate) [Metoprolol Succinate] 50 mg   PO   Vitamin D3 [cholecalciferol (vitamin D3)] Capsule 1,000 unit Oral   Levothroid (Levothyroxine**) [Levothyroxine**] 125 Microgram   PO   Myrbetriq [mirabegron] tab 50 mg Oral        Medications administered today  Drug Name Ordering Dose Actual Dose     Medications prescribed today  Date Description Ordering Physician      Orders  Date Description Ordering Physician   2020 F/U 20 Minutes Ema José   2020 LACTATE DEHYDROGENASE, TOTAL + CBC WITH AUTOMATED DIFFERENTIAL + COMPREHENSIVE METABOLIC PANEL Ema José   2020 F/U 20 Minutes + 1 Year Ema José     Next Visit Information    Appointment Date Appointment Time Activity   2021 10:30 AM MD Prep 30   2021 11:00 AM F/U 20 Minutes            Special instructions          Reminders    · If you did not  schedule your follow up appointment at the time of your visit, please call the department at [phone].  · Please provide a copy of this summary of care to your other providers.  · Please remember to bring the following items to your next appointment:  o Current medications or a list of your current medications.  o Insurance card and a photo ID.  § Please bring insurance and ID cards with you every time you come.  For safety and billing reasons, we must have copies of these documents in your medical record and we must verify your identity on a regular basis.  o Primary care physician referral, if applicable.  § If you are a member of an HMO or PPO that requires a referral before receiving specialty care, please obtain the referral from your primary care physician (PCP) prior to your appointment.  Please contact your insurance provider to learn if your coverage requires referrals to a specialist. If you do not have a valid referral, you may be asked to reschedule your visit.  Some referrals are valid for a certain number of weeks or visits; please keep track of how many weeks or visits have passed since obtaining the referral so that you will know when to ask your PCP for a new referral.  Obtaining referrals is your responsibility.  Your insurance provider will likely require you to pay the full cost of visits without a valid referral.        This summary document is based solely upon information made available to the [facility] staff as of 01/08/20.  Please notify us of additional information related to your care at [phone].     no

## 2021-08-06 NOTE — DISCUSSION/SUMMARY
[FreeTextEntry1] : Preop for umbilical hernia repair.\par Overweight for diet/exercise\par Asthma meets goals. Denies significant nocturnal symptoms. Rare beta agonist use. Denies significant cough, wheezing, SOB.\par Osteoarthritis\par Renal Stones seeing  also for prostate Lummerman\par HTN with mild elevation in blood pressure.\par Hyperlipidemia\par Status post knee replacement \par Bilat leg swelling left greater than right improved

## 2021-08-06 NOTE — HISTORY OF PRESENT ILLNESS
[TextBox_4] :  went for presurgical testing yesterday in [place] umbilical hernia repair by Dr Hale on Friday on 8/6\par phone number for Radha [phone] opt 2, opt 2 \par \par Patient presently feeling well.\par Denies recent respiratory infections.\par Denies chest pain or shortness of breath.

## 2021-08-09 ENCOUNTER — EMERGENCY (EMERGENCY)
Facility: HOSPITAL | Age: 61
LOS: 1 days | Discharge: LEFT BEFORE DISCHARGE | End: 2021-08-09
Admitting: EMERGENCY MEDICINE
Payer: COMMERCIAL

## 2021-08-09 ENCOUNTER — EMERGENCY (EMERGENCY)
Facility: HOSPITAL | Age: 61
LOS: 1 days | Discharge: HOME | End: 2021-08-09
Attending: EMERGENCY MEDICINE
Payer: COMMERCIAL

## 2021-08-09 VITALS
RESPIRATION RATE: 18 BRPM | HEIGHT: 74 IN | DIASTOLIC BLOOD PRESSURE: 93 MMHG | WEIGHT: 315 LBS | TEMPERATURE: 98 F | HEART RATE: 100 BPM | SYSTOLIC BLOOD PRESSURE: 150 MMHG | OXYGEN SATURATION: 98 %

## 2021-08-09 VITALS
SYSTOLIC BLOOD PRESSURE: 135 MMHG | TEMPERATURE: 98 F | OXYGEN SATURATION: 97 % | DIASTOLIC BLOOD PRESSURE: 93 MMHG | RESPIRATION RATE: 16 BRPM | HEART RATE: 84 BPM

## 2021-08-09 VITALS
DIASTOLIC BLOOD PRESSURE: 86 MMHG | SYSTOLIC BLOOD PRESSURE: 133 MMHG | RESPIRATION RATE: 18 BRPM | WEIGHT: 315 LBS | HEART RATE: 104 BPM | OXYGEN SATURATION: 97 % | TEMPERATURE: 97 F | HEIGHT: 74 IN

## 2021-08-09 DIAGNOSIS — Z90.49 ACQUIRED ABSENCE OF OTHER SPECIFIED PARTS OF DIGESTIVE TRACT: Chronic | ICD-10-CM

## 2021-08-09 DIAGNOSIS — Z96.659 PRESENCE OF UNSPECIFIED ARTIFICIAL KNEE JOINT: Chronic | ICD-10-CM

## 2021-08-09 PROBLEM — E78.5 HYPERLIPIDEMIA, UNSPECIFIED: Chronic | Status: ACTIVE | Noted: 2021-08-02

## 2021-08-09 LAB
ALBUMIN SERPL ELPH-MCNC: 4.2 G/DL — SIGNIFICANT CHANGE UP (ref 3.3–5)
ALP SERPL-CCNC: 76 U/L — SIGNIFICANT CHANGE UP (ref 40–120)
ALT FLD-CCNC: <5 U/L — LOW (ref 10–45)
ANION GAP SERPL CALC-SCNC: 12 MMOL/L — SIGNIFICANT CHANGE UP (ref 5–17)
AST SERPL-CCNC: 30 U/L — SIGNIFICANT CHANGE UP (ref 10–40)
BASOPHILS # BLD AUTO: 0.02 K/UL — SIGNIFICANT CHANGE UP (ref 0–0.2)
BASOPHILS NFR BLD AUTO: 0.2 % — SIGNIFICANT CHANGE UP (ref 0–2)
BILIRUB SERPL-MCNC: 0.3 MG/DL — SIGNIFICANT CHANGE UP (ref 0.2–1.2)
BUN SERPL-MCNC: 18 MG/DL — SIGNIFICANT CHANGE UP (ref 7–23)
CALCIUM SERPL-MCNC: 9.7 MG/DL — SIGNIFICANT CHANGE UP (ref 8.4–10.5)
CHLORIDE SERPL-SCNC: 101 MMOL/L — SIGNIFICANT CHANGE UP (ref 96–108)
CO2 SERPL-SCNC: 25 MMOL/L — SIGNIFICANT CHANGE UP (ref 22–31)
CREAT SERPL-MCNC: 0.87 MG/DL — SIGNIFICANT CHANGE UP (ref 0.5–1.3)
EOSINOPHIL # BLD AUTO: 0.38 K/UL — SIGNIFICANT CHANGE UP (ref 0–0.5)
EOSINOPHIL NFR BLD AUTO: 4.1 % — SIGNIFICANT CHANGE UP (ref 0–6)
GLUCOSE SERPL-MCNC: 106 MG/DL — HIGH (ref 70–99)
HCT VFR BLD CALC: 40.9 % — SIGNIFICANT CHANGE UP (ref 39–50)
HGB BLD-MCNC: 13.6 G/DL — SIGNIFICANT CHANGE UP (ref 13–17)
IMM GRANULOCYTES NFR BLD AUTO: 0.2 % — SIGNIFICANT CHANGE UP (ref 0–1.5)
LYMPHOCYTES # BLD AUTO: 1.85 K/UL — SIGNIFICANT CHANGE UP (ref 1–3.3)
LYMPHOCYTES # BLD AUTO: 19.8 % — SIGNIFICANT CHANGE UP (ref 13–44)
MCHC RBC-ENTMCNC: 32.1 PG — SIGNIFICANT CHANGE UP (ref 27–34)
MCHC RBC-ENTMCNC: 33.3 GM/DL — SIGNIFICANT CHANGE UP (ref 32–36)
MCV RBC AUTO: 96.5 FL — SIGNIFICANT CHANGE UP (ref 80–100)
MONOCYTES # BLD AUTO: 0.8 K/UL — SIGNIFICANT CHANGE UP (ref 0–0.9)
MONOCYTES NFR BLD AUTO: 8.5 % — SIGNIFICANT CHANGE UP (ref 2–14)
NEUTROPHILS # BLD AUTO: 6.29 K/UL — SIGNIFICANT CHANGE UP (ref 1.8–7.4)
NEUTROPHILS NFR BLD AUTO: 67.2 % — SIGNIFICANT CHANGE UP (ref 43–77)
NRBC # BLD: 0 /100 WBCS — SIGNIFICANT CHANGE UP (ref 0–0)
PLATELET # BLD AUTO: 233 K/UL — SIGNIFICANT CHANGE UP (ref 150–400)
POTASSIUM SERPL-MCNC: 3.9 MMOL/L — SIGNIFICANT CHANGE UP (ref 3.5–5.3)
POTASSIUM SERPL-SCNC: 3.9 MMOL/L — SIGNIFICANT CHANGE UP (ref 3.5–5.3)
PROT SERPL-MCNC: 7.1 G/DL — SIGNIFICANT CHANGE UP (ref 6–8.3)
RBC # BLD: 4.24 M/UL — SIGNIFICANT CHANGE UP (ref 4.2–5.8)
RBC # FLD: 12.6 % — SIGNIFICANT CHANGE UP (ref 10.3–14.5)
SARS-COV-2 RNA SPEC QL NAA+PROBE: SIGNIFICANT CHANGE UP
SODIUM SERPL-SCNC: 138 MMOL/L — SIGNIFICANT CHANGE UP (ref 135–145)
WBC # BLD: 9.36 K/UL — SIGNIFICANT CHANGE UP (ref 3.8–10.5)
WBC # FLD AUTO: 9.36 K/UL — SIGNIFICANT CHANGE UP (ref 3.8–10.5)

## 2021-08-09 PROCEDURE — 87040 BLOOD CULTURE FOR BACTERIA: CPT

## 2021-08-09 PROCEDURE — L9991: CPT

## 2021-08-09 PROCEDURE — 80053 COMPREHEN METABOLIC PANEL: CPT

## 2021-08-09 PROCEDURE — U0005: CPT

## 2021-08-09 PROCEDURE — 74177 CT ABD & PELVIS W/CONTRAST: CPT | Mod: 26,MA

## 2021-08-09 PROCEDURE — 99285 EMERGENCY DEPT VISIT HI MDM: CPT

## 2021-08-09 PROCEDURE — U0003: CPT

## 2021-08-09 PROCEDURE — 74177 CT ABD & PELVIS W/CONTRAST: CPT | Mod: MA

## 2021-08-09 PROCEDURE — 99284 EMERGENCY DEPT VISIT MOD MDM: CPT | Mod: 25

## 2021-08-09 PROCEDURE — 85025 COMPLETE CBC W/AUTO DIFF WBC: CPT

## 2021-08-09 RX ORDER — AZTREONAM 2 G
1 VIAL (EA) INJECTION
Qty: 14 | Refills: 0
Start: 2021-08-09 | End: 2021-08-15

## 2021-08-09 RX ORDER — CEPHALEXIN 500 MG
500 CAPSULE ORAL ONCE
Refills: 0 | Status: COMPLETED | OUTPATIENT
Start: 2021-08-09 | End: 2021-08-09

## 2021-08-09 RX ORDER — CEPHALEXIN 500 MG
1 CAPSULE ORAL
Qty: 28 | Refills: 0
Start: 2021-08-09 | End: 2021-08-15

## 2021-08-09 RX ADMIN — Medication 500 MILLIGRAM(S): at 21:01

## 2021-08-09 RX ADMIN — Medication 1 TABLET(S): at 21:07

## 2021-08-09 NOTE — ED ADULT NURSE NOTE - OBJECTIVE STATEMENT
Patient here for evaluation of surgical incision from recent umbilical hernia surgery. Patient denies fevers, c/o redness and swelling.

## 2021-08-09 NOTE — CONSULT NOTE ADULT - NSICDXPILOT_GEN_ALL_CORE
[place] 48 y.o. F with h/o hypothyroidism, asthma, Chronic hep B, breast cancer on Chemo,  SIADH, admitted for observation for SIADH after Cytoxan today.

## 2021-08-09 NOTE — ED PROVIDER NOTE - NSFOLLOWUPINSTRUCTIONS_ED_ALL_ED_FT
Abscess    An abscess is an infected area that contains a collection of pus and debris. It can occur in almost any part of the body and occurs when the tissue gets infection. Symptoms include a painful mass that is red, warm, tender that might break open and HAVE drainage. If your health care provider gave you antibiotics make sure to take the full course and do not stop even if feeling better.     SEEK IMMEDIATE MEDICAL CARE IF YOU HAVE ANY OF THE FOLLOWING SYMPTOMS: chills, fever, vomiting, worsening pain.     FOLLOW UP WITH DR. Hale on TUESDAY    Take Tylenol 650mg every 6 hrs as needed for pain.  Take Motrin 400-600mg every 6 hrs as needed for pain. Take with food Abscess    An abscess is an infected area that contains a collection of pus and debris. It can occur in almost any part of the body and occurs when the tissue gets infection. Symptoms include a painful mass that is red, warm, tender that might break open and HAVE drainage. If your health care provider gave you antibiotics make sure to take the full course and do not stop even if feeling better.     SEEK IMMEDIATE MEDICAL CARE IF YOU HAVE ANY OF THE FOLLOWING SYMPTOMS: chills, fever, vomiting, worsening pain.     FOLLOW UP WITH DR. Hale on TUESDAY    Take Keflex 4x per day and Bactrim 2x per day    Take Tylenol 650mg every 6 hrs as needed for pain.    Take Motrin 400-600mg every 6 hrs as needed for pain. Take with food

## 2021-08-09 NOTE — ED PROVIDER NOTE - RAPID ASSESSMENT
60y M pt with PSHx of umbilical hernia repair on 8/7 presents to ED with redness and swelling around the surgical sight. States he spoke with his surgeon today who referred him to the ED for further evaluation. Denies fevers or chills. Pt is well appearing with erythema around surgical sight and lower abdomen.     Monae MONROY (Scribe) have documented this rapid assessment note under the dictation of Hazel Rodrigues () which has been reviewed and affirmed to be accurate. Patient was seen as a QDOC patient. The patient will be seen and further worked up in the main emergency department and their care will be completed by the main emergency department team along with a thorough physical exam. Receiving team will follow up on labs, analgesia, any clinical imaging, reassess and disposition as clinically indicated, all decisions regarding the progression of care will be made at their discretion. 60y M pt with PSHx of umbilical hernia repair on 8/7 presents to ED with redness and swelling around the surgical sight. States he spoke with his surgeon today who referred him to the ED for further evaluation. Denies fevers or chills. Pt is well appearing with erythema around surgical sight and lower abdomen.     Monae MONROY (Scribe) have documented this rapid assessment note under the dictation of Hazel Rodrigues () which has been reviewed and affirmed to be accurate. Patient was seen as a QDOC patient. The patient will be seen and further worked up in the main emergency department and their care will be completed by the main emergency department team along with a thorough physical exam. Receiving team will follow up on labs, analgesia, any clinical imaging, reassess and disposition as clinically indicated, all decisions regarding the progression of care will be made at their discretion.    The scribe's documentation of the rapid assessment has been prepared under my direction and personally reviewed by me in its entirety. I confirm that the note above accurately reflects the rapid assessment performed by me.  A full history and physical exam, and follow up on all labs and imaging will be performed by the primary team.  - Hazel Rodrigues DO

## 2021-08-09 NOTE — ED PROVIDER NOTE - CARE PROVIDER_API CALL
Porfirio Hale)  Surgery  [address], [address]  [place] [address]  Phone: [phone]  Fax: [phone]  Scheduled Appointment: 08/17/2021

## 2021-08-09 NOTE — ED ADULT NURSE NOTE - CHIEF COMPLAINT QUOTE
Pt had recent Umbilical Hernia surgery with Dr Hale on Fri 8/6 at [facility], now presenting with redness/swelling around incision site  denies fever/chills

## 2021-08-09 NOTE — ED PROVIDER NOTE - PATIENT PORTAL LINK FT
You can access the FollowMyHealth Patient Portal offered by [facility] by registering at the following website: [email or web address]. By joining [employer]’s FollowMyHealth portal, you will also be able to view your health information using other applications (apps) compatible with our system.

## 2021-08-09 NOTE — ED ADULT TRIAGE NOTE - CHIEF COMPLAINT QUOTE
sent by Dr. Mc for surgical site check. Pt had umbilical hernia repair this past Friday. Noted to red and swollen.

## 2021-08-09 NOTE — CONSULT NOTE ADULT - SUBJECTIVE AND OBJECTIVE BOX
BETTY ABREU [id number]  60y Male      HPI:  60 who presented to ED, sent in by Dr Hale, patient recently had an umbilical hernia repair in [place] with Dr Hale now with erythema and swelling at the incision site.    PAST MEDICAL & SURGICAL HISTORY:  recent umbilical hernia repair    MEDICATIONS  (STANDING):  MEDICATIONS  (PRN):    Allergies  No Known Allergies  Intolerances    REVIEW OF SYSTEMS  [ x ] A ten-point review of systems was otherwise negative except as noted.  [ ] Due to altered mental status/intubation, subjective information were not able to be obtained from the patient. History was obtained, to the extent possible, from review of the chart and collateral sources of information.      Vital Signs Last 24 Hrs  T(C): 36.8 (09 Aug 2021 17:35), Max: 36.9 (09 Aug 2021 16:08)  T(F): 98.2 (09 Aug 2021 17:35), Max: 98.4 (09 Aug 2021 16:08)  HR: 90 (09 Aug 2021 17:35) (90 - 100)  BP: 133/80 (09 Aug 2021 17:35) (133/80 - 150/93)  BP(mean): --  RR: 16 (09 Aug 2021 17:35) (16 - 18)  SpO2: 97% (09 Aug 2021 17:35) (97% - 98%)    PHYSICAL EXAM:  GENERAL: NAD, well-appearing  CHEST/LUNG: Clear to auscultation bilaterally  HEART: Regular rate and rhythm  ABDOMEN: some periincisional tenderness and erythema and ecchymosis, minimal swelling, no pus or drainage noted  EXTREMITIES:  No clubbing, cyanosis, or edema      LABS:  Labs:  CAPILLARY BLOOD GLUCOSE                              13.6   9.36  )-----------( 233      ( 09 Aug 2021 16:37 )             40.9       Auto Neutrophil %: 67.2 % (08-09-21 @ 16:37)  Auto Immature Granulocyte %: 0.2 % (08-09-21 @ 16:37)    08-09    138  |  101  |  18  ----------------------------<  106<H>  3.9   |  25  |  0.87      Calcium, Total Serum: 9.7 mg/dL (08-09-21 @ 16:37)      LFTs:             7.1  | 0.3  | 30       ------------------[76      ( 09 Aug 2021 16:37 )  4.2  | x    | <5          Lipase:x      Amylase:x

## 2021-08-09 NOTE — ED ADULT NURSE NOTE - OBJECTIVE STATEMENT
60 presents to the ED from home. A&Ox4, ambulatory c/o wound redness, swelling and itchiness. Pt had recent Umbilical Hernia surgery with Dr Hale on Fri 8/6 at [facility]. pt states he started to notice redness/swelling around incision site yesterday into this morning. denies fever/chills. denies history of DM. denies nausea, vomiting, decreased PO intake. 20G RAC. Patient undressed and placed into gown, call bell in hand and side rails up for safety. warm blanket provided, vital signs stable, pt in no acute distress.

## 2021-08-09 NOTE — ED PROVIDER NOTE - PROGRESS NOTE DETAILS
patient seen by surgery in the waiting room, plan for ctap and reassess. patient declining pain control - Saadia Casey PA-C spoke with surgery, they are comfortable with oral abx and outpatient follow up with Dr. Hale.

## 2021-08-09 NOTE — ED ADULT NURSE NOTE - EXPLANATION OF PATIENT'S REASON FOR LEAVING
Patient states he notified his surgeon that he was at [facility] and his surgeon told him he was to meet him at [facility].

## 2021-08-09 NOTE — ED ADULT TRIAGE NOTE - CHIEF COMPLAINT QUOTE
Pt had recent Umbilical Hernia surgery with Dr Hale on Fri 8/6 at [facility], now presenting with redness/swelling around incision site  denies fever/chills

## 2021-08-09 NOTE — ED PROVIDER NOTE - OBJECTIVE STATEMENT
61 y/o male umbilical hernia repair 8/6 at [place] with Dr. Hale presenting to the ED for erythema and warmth to the surgical site. advised by physician office to come into the ED for eval. no fever/chills/abd pain/n/v. last BM was today, normal. notes dressing changed in 8/7 was bloody, bought new dressings at pharmacy and noted significant itching to the area, when he removed the dressing this morning he noted redness and discomfort.

## 2021-08-09 NOTE — CONSULT NOTE ADULT - ASSESSMENT
Assessment:  60y Male with recent umbilical hernia repair with mesh by Dr Hale in [place] 3 days ago presenting to ED with erythema and ecchymosis around incision site. Afebrile, VSS, no leukocytosis.    Plan:  - please obtain CT abdo/pelvis with IV contrast, will follow up Assessment:  60y Male with recent umbilical hernia repair with mesh by Dr Hale in [place] 3 days ago presenting to ED with erythema and ecchymosis around incision site. Afebrile, VSS, no leukocytosis.    Plan:  - please obtain CT abdo/pelvis with IV contrast, will follow up      UPDATE:  CT scan showed a small air-containing intraperitoneal collection measuring 3.2x1.7cm with a phlegmon around the region of the umbilicus.    - No acute surgical intervention indicated.   - Ok to discharge patient home with 1 week of keflex and outpatient follow-up with Dr. Hale in 7-10 days.    Discussed with attending.

## 2021-08-09 NOTE — ED PROVIDER NOTE - ABDOMINAL EXAM
mild supraumbilical tenderness, erythema and some ecchymosis to the area surrounding incision, incision well appearing, dry with good approximation and no purulent drainage/soft

## 2021-08-09 NOTE — ED PROVIDER NOTE - ATTENDING CONTRIBUTION TO CARE
I, Tera Church, performed a history and physical exam of the patient and discussed their management with the resident and /or advanced care provider. I reviewed the resident and /or ACP's note and agree with the documented findings and plan of care. I was present and available for all procedures. Patient with recent hernia repair and sent to the ED to evaluate surgical site infection. Will evaluate CT and f/u with surgical team.  Myld erythema around surgical site, otherwise well appearing, dry without any purulent drainage.

## 2021-08-10 LAB — SURGICAL PATHOLOGY STUDY: SIGNIFICANT CHANGE UP

## 2021-08-11 ENCOUNTER — APPOINTMENT (OUTPATIENT)
Dept: SURGERY | Facility: CLINIC | Age: 61
End: 2021-08-11
Payer: COMMERCIAL

## 2021-08-11 VITALS
RESPIRATION RATE: 17 BRPM | HEART RATE: 92 BPM | SYSTOLIC BLOOD PRESSURE: 150 MMHG | DIASTOLIC BLOOD PRESSURE: 90 MMHG | OXYGEN SATURATION: 96 %

## 2021-08-11 DIAGNOSIS — K42.9 UMBILICAL HERNIA W/OUT OBSTRUCTION OR GANGRENE: ICD-10-CM

## 2021-08-11 PROCEDURE — 99024 POSTOP FOLLOW-UP VISIT: CPT

## 2021-08-11 PROCEDURE — 10060 I&D ABSCESS SIMPLE/SINGLE: CPT

## 2021-08-11 NOTE — HISTORY OF PRESENT ILLNESS
[de-identified] : I performed an umbilical hernia repair on this patient on August 6.  He was seen 2 days ago in the emergency room for possible cellulitis and swelling about the wound.  A CAT scan revealed a fluid collection and possible changes that were consistent with infection.  He was sent home on antibiotics.  Today in the office he has no complaints.  He is pain-free.  He has had no fever.

## 2021-08-11 NOTE — ASSESSMENT
[FreeTextEntry1] : This patient is suffering from a postoperative wound seroma.  I will see him again next week for possible repeat aspiration.

## 2021-08-11 NOTE — PHYSICAL EXAM
[de-identified] : Soft and nontender.  There is fluctuance beneath the umbilicus.  There is ecchymosis about the wound.  There is no blanching erythema.  12 cc of serosanguineous fluid were aspirated from the umbilicus.  A sterile dressing was applied.

## 2021-08-14 LAB
CULTURE RESULTS: SIGNIFICANT CHANGE UP
SPECIMEN SOURCE: SIGNIFICANT CHANGE UP

## 2021-08-15 LAB
CULTURE RESULTS: SIGNIFICANT CHANGE UP
SPECIMEN SOURCE: SIGNIFICANT CHANGE UP

## 2021-08-19 ENCOUNTER — APPOINTMENT (OUTPATIENT)
Dept: SURGERY | Facility: CLINIC | Age: 61
End: 2021-08-19
Payer: COMMERCIAL

## 2021-08-19 PROCEDURE — 99024 POSTOP FOLLOW-UP VISIT: CPT

## 2021-08-19 PROCEDURE — 10060 I&D ABSCESS SIMPLE/SINGLE: CPT | Mod: 78

## 2021-08-19 NOTE — HISTORY OF PRESENT ILLNESS
[de-identified] : This patient is suffering from a postoperative seroma after his umbilical hernia repair.  The seroma has reaccumulated.  He denies any pain.  He brought up, again, a mildly painful subcutaneous mass in the right lower back which has been present for several years.  Denies trauma to this area.

## 2021-08-19 NOTE — PHYSICAL EXAM
[de-identified] : There is fluctuance within the umbilicus.  About 65 cc of serosanguineous fluid was aspirated under sterile conditions.  Dry sterile dressing was applied. [de-identified] : There is a 4 x 2 cm mobile slightly tender soft tissue mass of the right lower back.  There are no skin changes over this mass.

## 2021-08-19 NOTE — CONSULT LETTER
[Dear  ___] : Dear  [unfilled], [Consult Letter:] : I had the pleasure of evaluating your patient, [unfilled]. [Please see my note below.] : Please see my note below. [Consult Closing:] : Thank you very much for allowing me to participate in the care of this patient.  If you have any questions, please do not hesitate to contact me. [Sincerely,] : Sincerely, [FreeTextEntry3] : Porfirio Brown MD, FACS\par [facility]\par [facility]\par [address]\par [place]  [address]\par Tel: [phone]\par Email: [email or web address] \par

## 2021-08-19 NOTE — PLAN
[FreeTextEntry1] : I will see this patient again in 2 weeks time to reexamine the umbilicus and aspirate again should the seroma reaccumulate.  We will also plan an ambulatory procedure for removal of what is probably a lipoma of the right lower back

## 2021-08-25 ENCOUNTER — APPOINTMENT (OUTPATIENT)
Dept: SURGERY | Facility: CLINIC | Age: 61
End: 2021-08-25
Payer: COMMERCIAL

## 2021-08-25 VITALS
RESPIRATION RATE: 17 BRPM | HEART RATE: 97 BPM | TEMPERATURE: 98.3 F | DIASTOLIC BLOOD PRESSURE: 81 MMHG | OXYGEN SATURATION: 95 % | SYSTOLIC BLOOD PRESSURE: 124 MMHG

## 2021-08-25 DIAGNOSIS — K43.0 INCISIONAL HERNIA WITH OBSTRUCTION, W/OUT GANGRENE: ICD-10-CM

## 2021-08-25 PROCEDURE — 10160 PNXR ASPIR ABSC HMTMA BULLA: CPT | Mod: 78

## 2021-08-25 PROCEDURE — 99024 POSTOP FOLLOW-UP VISIT: CPT

## 2021-08-25 NOTE — HISTORY OF PRESENT ILLNESS
[de-identified] : This patient underwent the aspiration of a postoperative seroma after his umbilical hernia repair.  Is undergone 2 successive aspirations.  The seroma has reaccumulated

## 2021-08-25 NOTE — PHYSICAL EXAM
[de-identified] : 70 cc of blood-tinged fluid were aspirated from the umbilicus under sterile conditions.  Dry sterile dressing was applied

## 2021-08-27 ENCOUNTER — TRANSCRIPTION ENCOUNTER (OUTPATIENT)
Age: 61
End: 2021-08-27

## 2021-08-31 ENCOUNTER — APPOINTMENT (OUTPATIENT)
Dept: SURGERY | Facility: CLINIC | Age: 61
End: 2021-08-31
Payer: COMMERCIAL

## 2021-08-31 ENCOUNTER — APPOINTMENT (OUTPATIENT)
Dept: PULMONOLOGY | Facility: CLINIC | Age: 61
End: 2021-08-31
Payer: COMMERCIAL

## 2021-08-31 VITALS
OXYGEN SATURATION: 94 % | HEART RATE: 97 BPM | DIASTOLIC BLOOD PRESSURE: 77 MMHG | TEMPERATURE: 98.3 F | SYSTOLIC BLOOD PRESSURE: 144 MMHG

## 2021-08-31 VITALS — DIASTOLIC BLOOD PRESSURE: 86 MMHG | SYSTOLIC BLOOD PRESSURE: 129 MMHG | RESPIRATION RATE: 17 BRPM | HEART RATE: 94 BPM

## 2021-08-31 DIAGNOSIS — S30.1XXA CONTUSION OF ABDOMINAL WALL, INITIAL ENCOUNTER: ICD-10-CM

## 2021-08-31 PROCEDURE — 99213 OFFICE O/P EST LOW 20 MIN: CPT

## 2021-08-31 PROCEDURE — 99024 POSTOP FOLLOW-UP VISIT: CPT

## 2021-08-31 NOTE — HISTORY OF PRESENT ILLNESS
[de-identified] : This patient underwent the aspiration of a postoperative seroma after his umbilical hernia repair. Is undergone 2 successive aspirations. The seroma has reaccumulated. \par

## 2021-08-31 NOTE — ASSESSMENT
[FreeTextEntry1] : 40 cc of a serosanguineous fluid was aspirated a pressure dressing was applied patient is to leave the pressure dressing on until Thursday.  He is to see Dr. Hale in 1 week.

## 2021-09-08 ENCOUNTER — OUTPATIENT (OUTPATIENT)
Dept: OUTPATIENT SERVICES | Facility: HOSPITAL | Age: 61
LOS: 1 days | End: 2021-09-08
Payer: COMMERCIAL

## 2021-09-08 VITALS
HEIGHT: 74 IN | SYSTOLIC BLOOD PRESSURE: 134 MMHG | HEART RATE: 90 BPM | TEMPERATURE: 98 F | OXYGEN SATURATION: 97 % | DIASTOLIC BLOOD PRESSURE: 84 MMHG | WEIGHT: 315 LBS | RESPIRATION RATE: 18 BRPM

## 2021-09-08 DIAGNOSIS — D17.1 BENIGN LIPOMATOUS NEOPLASM OF SKIN AND SUBCUTANEOUS TISSUE OF TRUNK: ICD-10-CM

## 2021-09-08 DIAGNOSIS — Z96.659 PRESENCE OF UNSPECIFIED ARTIFICIAL KNEE JOINT: Chronic | ICD-10-CM

## 2021-09-08 DIAGNOSIS — Z90.49 ACQUIRED ABSENCE OF OTHER SPECIFIED PARTS OF DIGESTIVE TRACT: Chronic | ICD-10-CM

## 2021-09-08 DIAGNOSIS — Z01.818 ENCOUNTER FOR OTHER PREPROCEDURAL EXAMINATION: ICD-10-CM

## 2021-09-08 LAB
ANION GAP SERPL CALC-SCNC: 10 MMOL/L — SIGNIFICANT CHANGE UP (ref 5–17)
BUN SERPL-MCNC: 12 MG/DL — SIGNIFICANT CHANGE UP (ref 7–23)
CALCIUM SERPL-MCNC: 9.6 MG/DL — SIGNIFICANT CHANGE UP (ref 8.4–10.5)
CHLORIDE SERPL-SCNC: 101 MMOL/L — SIGNIFICANT CHANGE UP (ref 96–108)
CO2 SERPL-SCNC: 26 MMOL/L — SIGNIFICANT CHANGE UP (ref 22–31)
CREAT SERPL-MCNC: 0.88 MG/DL — SIGNIFICANT CHANGE UP (ref 0.5–1.3)
GLUCOSE SERPL-MCNC: 105 MG/DL — HIGH (ref 70–99)
HCT VFR BLD CALC: 43.7 % — SIGNIFICANT CHANGE UP (ref 39–50)
HGB BLD-MCNC: 14.8 G/DL — SIGNIFICANT CHANGE UP (ref 13–17)
MCHC RBC-ENTMCNC: 33.1 PG — SIGNIFICANT CHANGE UP (ref 27–34)
MCHC RBC-ENTMCNC: 33.9 GM/DL — SIGNIFICANT CHANGE UP (ref 32–36)
MCV RBC AUTO: 97.8 FL — SIGNIFICANT CHANGE UP (ref 80–100)
NRBC # BLD: 0 /100 WBCS — SIGNIFICANT CHANGE UP (ref 0–0)
PLATELET # BLD AUTO: 227 K/UL — SIGNIFICANT CHANGE UP (ref 150–400)
POTASSIUM SERPL-MCNC: 4.3 MMOL/L — SIGNIFICANT CHANGE UP (ref 3.5–5.3)
POTASSIUM SERPL-SCNC: 4.3 MMOL/L — SIGNIFICANT CHANGE UP (ref 3.5–5.3)
RBC # BLD: 4.47 M/UL — SIGNIFICANT CHANGE UP (ref 4.2–5.8)
RBC # FLD: 12.6 % — SIGNIFICANT CHANGE UP (ref 10.3–14.5)
SARS-COV-2 RNA SPEC QL NAA+PROBE: SIGNIFICANT CHANGE UP
SODIUM SERPL-SCNC: 137 MMOL/L — SIGNIFICANT CHANGE UP (ref 135–145)
WBC # BLD: 7.62 K/UL — SIGNIFICANT CHANGE UP (ref 3.8–10.5)
WBC # FLD AUTO: 7.62 K/UL — SIGNIFICANT CHANGE UP (ref 3.8–10.5)

## 2021-09-08 PROCEDURE — G0463: CPT

## 2021-09-08 PROCEDURE — U0003: CPT

## 2021-09-08 PROCEDURE — 36415 COLL VENOUS BLD VENIPUNCTURE: CPT

## 2021-09-08 PROCEDURE — 85027 COMPLETE CBC AUTOMATED: CPT

## 2021-09-08 PROCEDURE — U0005: CPT

## 2021-09-08 PROCEDURE — 80048 BASIC METABOLIC PNL TOTAL CA: CPT

## 2021-09-08 RX ORDER — DICLOFENAC SODIUM 75 MG/1
1 TABLET, DELAYED RELEASE ORAL
Qty: 0 | Refills: 0 | DISCHARGE

## 2021-09-08 RX ORDER — SPIRONOLACT/HYDROCHLOROTHIAZID 25 MG-25MG
1 TABLET ORAL
Qty: 0 | Refills: 0 | DISCHARGE

## 2021-09-08 NOTE — H&P PST ADULT - HISTORY OF PRESENT ILLNESS
61 y/o male with PMH: Asthma, HTN, IBS, Sleep apnea, GERD, HLD, and OA presents for PST and COVID-19 testing. Has lower back mass which is causing discomfort when sleeping so required surgical removal.  Scheduled for excision of back mass with Dr Hale on 09/10/2021.      Had abcess post umbilical repair requiring antibiotic treatment and drainage 3 times and completed antibiotic treatment completed 8/23/2021. 59 y/o male with PMH: Asthma, HTN, IBS, Sleep apnea, GERD, HLD, and OA presents for PST and COVID-19 testing. Has lower back mass which is causing discomfort when sleeping requiring surgical removal.  Scheduled for excision of back mass with Dr Hale on 09/10/2021.      Had abcess post umbilical repair done 8/2021 requiring antibiotic treatment and drainage 3 times and completed antibiotic treatment completed 8/23/2021.

## 2021-09-08 NOTE — H&P PST ADULT - NSICDXPASTMEDICALHX_GEN_ALL_CORE_FT
PAST MEDICAL HISTORY:  Asthma Controlled    Chronic ethmoidal sinusitis     Chronic sphenoidal sinusitis     GERD (gastroesophageal reflux disease)     HLD (hyperlipidemia)     Hypertension     IBS (Irritable Bowel Syndrome)     Kidney stones     Lyme disease 2015    MVP (mitral valve prolapse)     Obesity, Morbid     Obstructed umbilical hernia     Poison Ivy reaction in early June & was treated.    Sleep Apnea Mild -no cpap mask     PAST MEDICAL HISTORY:  Abscess, umbilical     Asthma Controlled    Chronic ethmoidal sinusitis     Chronic sphenoidal sinusitis     GERD (gastroesophageal reflux disease)     HLD (hyperlipidemia)     Hypertension     IBS (Irritable Bowel Syndrome)     Kidney stones     Lyme disease 2015    MVP (mitral valve prolapse)     Obesity, Morbid     Obstructed umbilical hernia     Poison Ivy reaction in early June & was treated.    Sleep Apnea Mild -no cpap mask

## 2021-09-08 NOTE — H&P PST ADULT - ANESTHESIA, PREVIOUS REACTION, PROFILE
" I was told my oxygen levels dropped during the gallbladder surgery 2009"; denies family hx of malignant hyperthermia

## 2021-09-08 NOTE — H&P PST ADULT - PROBLEM SELECTOR PLAN 1
Scheduled for excision of back mass with Dr Hale on 09/10/2021.  Pre op instructions given and patient verbalized understanding.  CBC, BMP, EKG on chart.  Pending medical clearance and COVID-19 testing.  Chlorhexidene wash given with instructions.  IVY precautions- hx IVY- no CPAP use.  Instructed to stop all ASA, NSAIDs, vitamins and supplements

## 2021-09-08 NOTE — H&P PST ADULT - ASSESSMENT
Benign lipomatous neoplasm of skin and  Benign lipomatous neoplasm of skin and subcutaneous tissue of trunk

## 2021-09-08 NOTE — H&P PST ADULT - GASTROINTESTINAL COMMENTS
H/O IBS, Has umbillican hernia repair 8/2021 that required drainage due to post of ceroma which has been drained 3 times since procedure. umbilical mass present H/O IBS, Had umbilical hernia repair 8/2021 that required drainage due to post-op ceroma.  PO antibiotics completed 8/3/2021 per pt

## 2021-09-08 NOTE — H&P PST ADULT - NSICDXPASTSURGICALHX_GEN_ALL_CORE_FT
PAST SURGICAL HISTORY:  H/O umbilical hernia repair 8/2021    History of cholecystectomy     S/P Colonoscopy S/P Sigmoidoscopy - multiple    S/P knee replacement left knee    Sinus Polyp & cysts removed- 2004    Vasectomy Status 1993

## 2021-09-09 ENCOUNTER — TRANSCRIPTION ENCOUNTER (OUTPATIENT)
Age: 61
End: 2021-09-09

## 2021-09-09 ENCOUNTER — APPOINTMENT (OUTPATIENT)
Dept: SURGERY | Facility: CLINIC | Age: 61
End: 2021-09-09

## 2021-09-09 NOTE — ASSESSMENT
[FreeTextEntry1] : Awaiting preoperative testing.\par \par \par Addendum: 9/9/21\par Medical problems as above. Medical status maximized. No medical contraindications to surgery.\par \par \par \par \par \par \par

## 2021-09-09 NOTE — DISCUSSION/SUMMARY
[FreeTextEntry1] : Preop for lipoma removal.\par Overweight for diet/exercise\par Asthma meets goals. Denies significant nocturnal symptoms. Rare beta agonist use. Denies significant cough, wheezing, SOB.\par Osteoarthritis\par Renal Stones seeing  also for prostate Lummerman\par HTN with mild elevation in static blood pressure.\par Hyperlipidemia\par

## 2021-09-09 NOTE — HISTORY OF PRESENT ILLNESS
[TextBox_4] : Going for presurgical testing yesterday in [place] lipoma resection by Dr Hale on 9/10.\par phone number for Radha Dotson [phone] opt 2, opt 2 \par \par Preop for lipoma resection. \par Did well with hernia repair except complicated by abscess then has seroma.\par \par Feeling well.\par Denies chest pain or shortness of breath.\par No recent illness.\par

## 2021-09-10 ENCOUNTER — RESULT REVIEW (OUTPATIENT)
Age: 61
End: 2021-09-10

## 2021-09-10 ENCOUNTER — APPOINTMENT (OUTPATIENT)
Dept: SURGERY | Facility: HOSPITAL | Age: 61
End: 2021-09-10
Payer: COMMERCIAL

## 2021-09-10 ENCOUNTER — OUTPATIENT (OUTPATIENT)
Dept: OUTPATIENT SERVICES | Facility: HOSPITAL | Age: 61
LOS: 1 days | End: 2021-09-10
Payer: COMMERCIAL

## 2021-09-10 VITALS
DIASTOLIC BLOOD PRESSURE: 74 MMHG | HEART RATE: 77 BPM | SYSTOLIC BLOOD PRESSURE: 132 MMHG | TEMPERATURE: 97 F | RESPIRATION RATE: 16 BRPM | OXYGEN SATURATION: 96 %

## 2021-09-10 DIAGNOSIS — E78.5 HYPERLIPIDEMIA, UNSPECIFIED: ICD-10-CM

## 2021-09-10 DIAGNOSIS — D17.1 BENIGN LIPOMATOUS NEOPLASM OF SKIN AND SUBCUTANEOUS TISSUE OF TRUNK: ICD-10-CM

## 2021-09-10 DIAGNOSIS — Z98.890 OTHER SPECIFIED POSTPROCEDURAL STATES: Chronic | ICD-10-CM

## 2021-09-10 DIAGNOSIS — Z96.659 PRESENCE OF UNSPECIFIED ARTIFICIAL KNEE JOINT: Chronic | ICD-10-CM

## 2021-09-10 DIAGNOSIS — K21.9 GASTRO-ESOPHAGEAL REFLUX DISEASE WITHOUT ESOPHAGITIS: ICD-10-CM

## 2021-09-10 DIAGNOSIS — G47.33 OBSTRUCTIVE SLEEP APNEA (ADULT) (PEDIATRIC): ICD-10-CM

## 2021-09-10 DIAGNOSIS — I10 ESSENTIAL (PRIMARY) HYPERTENSION: ICD-10-CM

## 2021-09-10 DIAGNOSIS — Z90.49 ACQUIRED ABSENCE OF OTHER SPECIFIED PARTS OF DIGESTIVE TRACT: Chronic | ICD-10-CM

## 2021-09-10 PROCEDURE — 21931 EXC BACK LES SC 3 CM/>: CPT | Mod: 79

## 2021-09-10 PROCEDURE — 21932 EXC BACK TUM DEEP < 5 CM: CPT

## 2021-09-10 PROCEDURE — 10160 PNXR ASPIR ABSC HMTMA BULLA: CPT | Mod: 79

## 2021-09-10 PROCEDURE — 88304 TISSUE EXAM BY PATHOLOGIST: CPT

## 2021-09-10 PROCEDURE — 10160 PNXR ASPIR ABSC HMTMA BULLA: CPT | Mod: XS

## 2021-09-10 PROCEDURE — 88304 TISSUE EXAM BY PATHOLOGIST: CPT | Mod: 26

## 2021-09-10 RX ORDER — ONDANSETRON 8 MG/1
4 TABLET, FILM COATED ORAL ONCE
Refills: 0 | Status: DISCONTINUED | OUTPATIENT
Start: 2021-09-10 | End: 2021-09-10

## 2021-09-10 RX ORDER — SODIUM CHLORIDE 9 MG/ML
1000 INJECTION, SOLUTION INTRAVENOUS
Refills: 0 | Status: DISCONTINUED | OUTPATIENT
Start: 2021-09-10 | End: 2021-09-10

## 2021-09-10 RX ORDER — ACETAMINOPHEN 500 MG
650 TABLET ORAL ONCE
Refills: 0 | Status: DISCONTINUED | OUTPATIENT
Start: 2021-09-10 | End: 2021-09-24

## 2021-09-10 RX ORDER — HYDROMORPHONE HYDROCHLORIDE 2 MG/ML
0.5 INJECTION INTRAMUSCULAR; INTRAVENOUS; SUBCUTANEOUS
Refills: 0 | Status: DISCONTINUED | OUTPATIENT
Start: 2021-09-10 | End: 2021-09-10

## 2021-09-10 RX ORDER — HYDROMORPHONE HYDROCHLORIDE 2 MG/ML
0.25 INJECTION INTRAMUSCULAR; INTRAVENOUS; SUBCUTANEOUS
Refills: 0 | Status: DISCONTINUED | OUTPATIENT
Start: 2021-09-10 | End: 2021-09-10

## 2021-09-10 RX ADMIN — SODIUM CHLORIDE 50 MILLILITER(S): 9 INJECTION, SOLUTION INTRAVENOUS at 08:45

## 2021-09-10 NOTE — BRIEF OPERATIVE NOTE - NSICDXBRIEFPROCEDURE_GEN_ALL_CORE_FT
PROCEDURES:  Excisional biopsy of mass of back 10-Sep-2021 11:12:13 right lower back mass Karmen Oviedo

## 2021-09-10 NOTE — ASU PATIENT PROFILE, ADULT - NSICDXPASTMEDICALHX_GEN_ALL_CORE_FT
PAST MEDICAL HISTORY:  Abscess, umbilical     Asthma Controlled    Chronic ethmoidal sinusitis     Chronic sphenoidal sinusitis     GERD (gastroesophageal reflux disease)     HLD (hyperlipidemia)     Hypertension     IBS (Irritable Bowel Syndrome)     Kidney stones     Lyme disease 2015    MVP (mitral valve prolapse)     Obesity, Morbid     Obstructed umbilical hernia     Poison Ivy reaction in early June & was treated.    Sleep Apnea Mild -no cpap mask

## 2021-09-10 NOTE — ASU DISCHARGE PLAN (ADULT/PEDIATRIC) - CARE PROVIDER_API CALL
Porfirio Hale)  Surgery  [address], [address]  [place] [address]  Phone: [phone]  Fax: [phone]  Follow Up Time: 1 week

## 2021-09-10 NOTE — BRIEF OPERATIVE NOTE - NSICDXBRIEFPOSTOP_GEN_ALL_CORE_FT
POST-OP DIAGNOSIS:  Palpable mass of lower back 10-Sep-2021 11:10:52 right lower back Karmen Oviedo

## 2021-09-13 PROBLEM — K21.9 GASTRO-ESOPHAGEAL REFLUX DISEASE WITHOUT ESOPHAGITIS: Chronic | Status: ACTIVE | Noted: 2021-09-08

## 2021-09-13 PROBLEM — N20.0 CALCULUS OF KIDNEY: Chronic | Status: ACTIVE | Noted: 2021-09-08

## 2021-09-13 LAB — SURGICAL PATHOLOGY STUDY: SIGNIFICANT CHANGE UP

## 2021-09-22 ENCOUNTER — APPOINTMENT (OUTPATIENT)
Dept: SURGERY | Facility: CLINIC | Age: 61
End: 2021-09-22
Payer: COMMERCIAL

## 2021-09-22 VITALS
OXYGEN SATURATION: 98 % | SYSTOLIC BLOOD PRESSURE: 126 MMHG | DIASTOLIC BLOOD PRESSURE: 80 MMHG | RESPIRATION RATE: 17 BRPM | HEART RATE: 94 BPM | TEMPERATURE: 97.1 F

## 2021-09-22 DIAGNOSIS — D17.1 BENIGN LIPOMATOUS NEOPLASM OF SKIN AND SUBCUTANEOUS TISSUE OF TRUNK: ICD-10-CM

## 2021-09-22 PROCEDURE — 99024 POSTOP FOLLOW-UP VISIT: CPT

## 2021-09-22 PROCEDURE — 10160 PNXR ASPIR ABSC HMTMA BULLA: CPT | Mod: 58

## 2021-09-22 NOTE — PHYSICAL EXAM
[de-identified] : 12 cc of blood-tinged fluid were aspirated from umbilicus.  A dry sterile dressing was applied [de-identified] : The right lower back wound is clean dry and healing well.  Sutures were removed and Steri-Strips were applied

## 2021-09-22 NOTE — HISTORY OF PRESENT ILLNESS
[de-identified] : This patient has no complaints.  He feels that the fluid in his umbilicus has reaccumulated to a small extent

## 2021-09-22 NOTE — REASON FOR VISIT
[Post Op: _________] : a [unfilled] post op visit [FreeTextEntry1] : s/p excisional biopsy of lower back mass on 09/10/21

## 2021-09-27 ENCOUNTER — LABORATORY RESULT (OUTPATIENT)
Age: 61
End: 2021-09-27

## 2021-09-27 ENCOUNTER — APPOINTMENT (OUTPATIENT)
Dept: PULMONOLOGY | Facility: CLINIC | Age: 61
End: 2021-09-27
Payer: COMMERCIAL

## 2021-09-27 VITALS
WEIGHT: 315 LBS | TEMPERATURE: 97.8 F | HEIGHT: 74 IN | SYSTOLIC BLOOD PRESSURE: 130 MMHG | HEART RATE: 94 BPM | RESPIRATION RATE: 16 BRPM | DIASTOLIC BLOOD PRESSURE: 78 MMHG | BODY MASS INDEX: 40.43 KG/M2 | OXYGEN SATURATION: 93 %

## 2021-09-27 DIAGNOSIS — I05.9 RHEUMATIC MITRAL VALVE DISEASE, UNSPECIFIED: ICD-10-CM

## 2021-09-27 LAB
ALBUMIN: 30
BILIRUB UR QL STRIP: NORMAL
CLARITY UR: CLEAR
COLLECTION METHOD: NORMAL
CREATININE: 30
GLUCOSE UR-MCNC: NORMAL
HCG UR QL: 0.2 EU/DL
HGB UR QL STRIP.AUTO: NORMAL
KETONES UR-MCNC: NORMAL
LEUKOCYTE ESTERASE UR QL STRIP: NORMAL
MICROALBUMIN/CREAT UR TEST STR-RTO: 300
NITRITE UR QL STRIP: NORMAL
PH UR STRIP: 5.5
PROT UR STRIP-MCNC: NORMAL
SP GR UR STRIP: 1.03

## 2021-09-27 PROCEDURE — G0008: CPT

## 2021-09-27 PROCEDURE — 90686 IIV4 VACC NO PRSV 0.5 ML IM: CPT

## 2021-09-27 PROCEDURE — 81003 URINALYSIS AUTO W/O SCOPE: CPT | Mod: QW

## 2021-09-27 PROCEDURE — 36415 COLL VENOUS BLD VENIPUNCTURE: CPT

## 2021-09-27 PROCEDURE — 82044 UR ALBUMIN SEMIQUANTITATIVE: CPT | Mod: QW

## 2021-09-27 PROCEDURE — 99396 PREV VISIT EST AGE 40-64: CPT | Mod: 25

## 2021-09-28 NOTE — ASSESSMENT
[FreeTextEntry1] : Medications reviewed and renewed.\par Labs drawn in office today\par Follow-up in 6 months or sooner on a PRN basis.\par Consider repeat home sleep study.  Patient does not want the study at this time.

## 2021-09-28 NOTE — HISTORY OF PRESENT ILLNESS
[TextBox_4] : Colonoscopy 4 years\par Optho Y\par Derm Y\par \par Seeing  and ENT.\par \par Surgery went well.\par \par Still sleeps poorly due to back pain.\par Seeing rheumatology. \par \par Prior Poly AHI 1 RDI 21

## 2021-09-28 NOTE — DISCUSSION/SUMMARY
[FreeTextEntry1] : Overweight \par Asthma meets goals. Denies significant nocturnal symptoms. Rare beta agonist use. Denies significant cough, wheezing, SOB.\par Osteoarthritis\par Renal Stones seeing  also for prostate Lummerman\par HTN controlled\par Hyperlipidemia\par Poor sleep appears related to musculoskeletal issues.  Less likely related to progressive sleep apnea.

## 2021-09-28 NOTE — PHYSICAL EXAM
[No Acute Distress] : no acute distress [Normal Oropharynx] : normal oropharynx [Supple] : supple [No JVD] : no jvd [No Murmurs] : no murmurs [Normal S1, S2] : normal s1, s2 [Clear to Auscultation Bilaterally] : clear to auscultation bilaterally [Normal to Percussion] : normal to percussion [Benign] : benign [No HSM] : no hsm [No Clubbing] : no clubbing [No Cyanosis] : no cyanosis [Oriented x3] : oriented x3 [Normal Affect] : normal affect [No Focal Deficits] : no focal deficits [TextBox_2] : Overweight [TextBox_105] : trace edema

## 2021-09-29 LAB
25(OH)D3 SERPL-MCNC: 32.2 NG/ML
ALBUMIN SERPL ELPH-MCNC: 4.7 G/DL
ALP BLD-CCNC: 83 U/L
ALT SERPL-CCNC: 37 U/L
ANION GAP SERPL CALC-SCNC: 13 MMOL/L
AST SERPL-CCNC: 20 U/L
BASOPHILS # BLD AUTO: 0.03 K/UL
BASOPHILS NFR BLD AUTO: 0.5 %
BILIRUB DIRECT SERPL-MCNC: 0.1 MG/DL
BILIRUB INDIRECT SERPL-MCNC: 0.3 MG/DL
BILIRUB SERPL-MCNC: 0.4 MG/DL
BUN SERPL-MCNC: 20 MG/DL
CALCIUM SERPL-MCNC: 10 MG/DL
CHLORIDE SERPL-SCNC: 102 MMOL/L
CHOLEST SERPL-MCNC: 179 MG/DL
CO2 SERPL-SCNC: 24 MMOL/L
CREAT SERPL-MCNC: 1.05 MG/DL
EOSINOPHIL # BLD AUTO: 0.14 K/UL
EOSINOPHIL NFR BLD AUTO: 2.2 %
ESTIMATED AVERAGE GLUCOSE: 120 MG/DL
GLUCOSE SERPL-MCNC: 108 MG/DL
HBA1C MFR BLD HPLC: 5.8 %
HCT VFR BLD CALC: 44.5 %
HDLC SERPL-MCNC: 49 MG/DL
HGB BLD-MCNC: 14.6 G/DL
IMM GRANULOCYTES NFR BLD AUTO: 0.2 %
LDLC SERPL CALC-MCNC: 86 MG/DL
LYMPHOCYTES # BLD AUTO: 1.59 K/UL
LYMPHOCYTES NFR BLD AUTO: 25.3 %
MAN DIFF?: NORMAL
MCHC RBC-ENTMCNC: 32.8 GM/DL
MCHC RBC-ENTMCNC: 32.9 PG
MCV RBC AUTO: 100.2 FL
MONOCYTES # BLD AUTO: 0.49 K/UL
MONOCYTES NFR BLD AUTO: 7.8 %
NEUTROPHILS # BLD AUTO: 4.03 K/UL
NEUTROPHILS NFR BLD AUTO: 64 %
NONHDLC SERPL-MCNC: 130 MG/DL
PLATELET # BLD AUTO: 245 K/UL
POTASSIUM SERPL-SCNC: 5.1 MMOL/L
PROT SERPL-MCNC: 7.4 G/DL
RBC # BLD: 4.44 M/UL
RBC # FLD: 13 %
SODIUM SERPL-SCNC: 138 MMOL/L
T3 SERPL-MCNC: 95 NG/DL
T3RU NFR SERPL: 1 TBI
T4 FREE SERPL-MCNC: 1.8 NG/DL
T4 SERPL-MCNC: 8.4 UG/DL
TRIGL SERPL-MCNC: 219 MG/DL
TSH SERPL-ACNC: 1.1 UIU/ML
WBC # FLD AUTO: 6.29 K/UL

## 2021-10-25 ENCOUNTER — RX RENEWAL (OUTPATIENT)
Age: 61
End: 2021-10-25

## 2021-12-02 ENCOUNTER — TRANSCRIPTION ENCOUNTER (OUTPATIENT)
Age: 61
End: 2021-12-02

## 2021-12-20 ENCOUNTER — TRANSCRIPTION ENCOUNTER (OUTPATIENT)
Age: 61
End: 2021-12-20

## 2021-12-22 ENCOUNTER — NON-APPOINTMENT (OUTPATIENT)
Age: 61
End: 2021-12-22

## 2021-12-22 ENCOUNTER — TRANSCRIPTION ENCOUNTER (OUTPATIENT)
Age: 61
End: 2021-12-22

## 2022-01-04 NOTE — ASU PATIENT PROFILE, ADULT - AS SC BRADEN MOBILITY
I called and Patient wanted a doctors list left at the  for him to pick it up.  List left at desk for him to .   (4) no limitation

## 2022-03-12 ENCOUNTER — RX RENEWAL (OUTPATIENT)
Age: 62
End: 2022-03-12

## 2022-03-18 NOTE — H&P PST ADULT - BLOOD TRANSFUSION, PREVIOUS, PROFILE
Spoke to patient about BP control since switching Clonidine to 0.1 mg nightly. Patient states his BP has been stable overall. BP in the morning averages 116-120's/65-75. There have been a few times when BP was higher, but this was mostly when he was stressed. He is sleeping better at night. He goes to bed a little earlier, but doesn't seem to wake up every couple of hours. He will proceed with sleep evaluation as recommended previously. He has less daytime sleepiness since not taking Clonidine 0.1 mg bid (eliminated am dose). Told patient to call our office back if he sees BP trending higher in the future (over 140/90), but prefer BP readings where they are currently. Patient voiced understanding of info discussed. no

## 2022-03-21 ENCOUNTER — APPOINTMENT (OUTPATIENT)
Dept: PULMONOLOGY | Facility: CLINIC | Age: 62
End: 2022-03-21
Payer: COMMERCIAL

## 2022-03-21 VITALS
RESPIRATION RATE: 16 BRPM | DIASTOLIC BLOOD PRESSURE: 82 MMHG | OXYGEN SATURATION: 92 % | SYSTOLIC BLOOD PRESSURE: 143 MMHG | TEMPERATURE: 97.1 F | HEART RATE: 100 BPM

## 2022-03-21 VITALS — DIASTOLIC BLOOD PRESSURE: 72 MMHG | SYSTOLIC BLOOD PRESSURE: 118 MMHG

## 2022-03-21 DIAGNOSIS — K21.9 GASTRO-ESOPHAGEAL REFLUX DISEASE W/OUT ESOPHAGITIS: ICD-10-CM

## 2022-03-21 DIAGNOSIS — E78.1 PURE HYPERGLYCERIDEMIA: ICD-10-CM

## 2022-03-21 DIAGNOSIS — E78.00 PURE HYPERCHOLESTEROLEMIA, UNSPECIFIED: ICD-10-CM

## 2022-03-21 DIAGNOSIS — N20.9 URINARY CALCULUS, UNSPECIFIED: ICD-10-CM

## 2022-03-21 DIAGNOSIS — M54.50 LOW BACK PAIN, UNSPECIFIED: ICD-10-CM

## 2022-03-21 PROCEDURE — 99214 OFFICE O/P EST MOD 30 MIN: CPT | Mod: 25

## 2022-03-21 PROCEDURE — ZZZZZ: CPT

## 2022-03-21 PROCEDURE — 87811 SARS-COV-2 COVID19 W/OPTIC: CPT

## 2022-03-21 PROCEDURE — 94010 BREATHING CAPACITY TEST: CPT

## 2022-03-21 PROCEDURE — 95012 NITRIC OXIDE EXP GAS DETER: CPT

## 2022-03-21 NOTE — PROCEDURE
[FreeTextEntry1] : Venipuncture for labs\par 03/21/2022\par Pulmonary function testing\par FEV1, FVC, and FEV1/FVC are within normal limits. \par PFT attached relatively stable function.\par \par \par NIOX 53

## 2022-03-21 NOTE — ASSESSMENT
[FreeTextEntry1] : Trial of change to famotidine in a.m. and pantoprazole in evening.\par Continue PRN beta agonist.  Consider adding additional therapy if symptoms persist.\par Consider repeat home sleep study once back issues resolve\par will ask rheum  if can change NSAID to Celebrex\par \par will se ortho re lower back pain

## 2022-03-21 NOTE — PHYSICAL EXAM
[No Acute Distress] : no acute distress [Normal Oropharynx] : normal oropharynx [Supple] : supple [No JVD] : no jvd [Normal S1, S2] : normal s1, s2 [No Murmurs] : no murmurs [Clear to Auscultation Bilaterally] : clear to auscultation bilaterally [Normal to Percussion] : normal to percussion [Benign] : benign [No HSM] : no hsm [No Clubbing] : no clubbing [No Cyanosis] : no cyanosis [No Focal Deficits] : no focal deficits [Oriented x3] : oriented x3 [Normal Affect] : normal affect [TextBox_2] : Overweight [TextBox_105] : trace edema

## 2022-03-21 NOTE — HISTORY OF PRESENT ILLNESS
[TextBox_4] : Had COVID in December ,was not tested but everyone had it\par \par had MRI of prostrate last week had a anxiety attack during procedure\par since then feels like breathing is off, used Proventil a few times this week for sob\par happens at night, feels cant get a deep breath, when uses it it helps.\par no wheeze of cough, prior was not using albuterol\par feels like the same feeling in MRI machine\par poor sleep because of back pain to start muscle relaxer\par Having issues with GERD so now sleeps elevated , on famotidine bid\par \par saw rheum and had labs

## 2022-03-22 LAB
CHOLEST SERPL-MCNC: 146 MG/DL
COVID-19 NUCLEOCAPSID  GAM ANTIBODY INTERPRETATION: NEGATIVE
ESTIMATED AVERAGE GLUCOSE: 111 MG/DL
HBA1C MFR BLD HPLC: 5.5 %
HDLC SERPL-MCNC: 50 MG/DL
LDLC SERPL CALC-MCNC: 64 MG/DL
NONHDLC SERPL-MCNC: 96 MG/DL
SARS-COV-2 AB SERPL QL IA: 0.09 INDEX
SARS-COV-2 AG RESP QL IA.RAPID: NEGATIVE
TRIGL SERPL-MCNC: 160 MG/DL
URATE SERPL-MCNC: 4.8 MG/DL

## 2022-03-27 ENCOUNTER — NON-APPOINTMENT (OUTPATIENT)
Age: 62
End: 2022-03-27

## 2022-03-30 ENCOUNTER — APPOINTMENT (OUTPATIENT)
Dept: ORTHOPEDIC SURGERY | Facility: CLINIC | Age: 62
End: 2022-03-30
Payer: COMMERCIAL

## 2022-03-30 ENCOUNTER — NON-APPOINTMENT (OUTPATIENT)
Age: 62
End: 2022-03-30

## 2022-03-30 VITALS
WEIGHT: 315 LBS | HEART RATE: 96 BPM | SYSTOLIC BLOOD PRESSURE: 135 MMHG | HEIGHT: 74 IN | BODY MASS INDEX: 40.43 KG/M2 | DIASTOLIC BLOOD PRESSURE: 88 MMHG

## 2022-03-30 PROCEDURE — 99204 OFFICE O/P NEW MOD 45 MIN: CPT

## 2022-05-02 ENCOUNTER — APPOINTMENT (OUTPATIENT)
Dept: ORTHOPEDIC SURGERY | Facility: CLINIC | Age: 62
End: 2022-05-02
Payer: COMMERCIAL

## 2022-05-02 VITALS
WEIGHT: 315 LBS | DIASTOLIC BLOOD PRESSURE: 77 MMHG | HEIGHT: 74 IN | SYSTOLIC BLOOD PRESSURE: 124 MMHG | BODY MASS INDEX: 40.43 KG/M2 | HEART RATE: 102 BPM

## 2022-05-02 DIAGNOSIS — M51.36 OTHER INTERVERTEBRAL DISC DEGENERATION, LUMBAR REGION: ICD-10-CM

## 2022-05-02 PROCEDURE — 99214 OFFICE O/P EST MOD 30 MIN: CPT

## 2022-05-08 ENCOUNTER — RX RENEWAL (OUTPATIENT)
Age: 62
End: 2022-05-08

## 2022-06-27 ENCOUNTER — APPOINTMENT (OUTPATIENT)
Dept: PULMONOLOGY | Facility: CLINIC | Age: 62
End: 2022-06-27
Payer: COMMERCIAL

## 2022-06-27 VITALS
TEMPERATURE: 98 F | OXYGEN SATURATION: 94 % | HEART RATE: 86 BPM | DIASTOLIC BLOOD PRESSURE: 74 MMHG | SYSTOLIC BLOOD PRESSURE: 130 MMHG

## 2022-06-27 DIAGNOSIS — J98.11 ATELECTASIS: ICD-10-CM

## 2022-06-27 LAB — POCT - HEMOGLOBIN (HGB), QUANTITATIVE, TRANSCUTANEOUS: 15.9

## 2022-06-27 PROCEDURE — 71046 X-RAY EXAM CHEST 2 VIEWS: CPT

## 2022-06-27 PROCEDURE — 88738 HGB QUANT TRANSCUTANEOUS: CPT

## 2022-06-27 PROCEDURE — 99214 OFFICE O/P EST MOD 30 MIN: CPT | Mod: 25

## 2022-06-27 PROCEDURE — 94727 GAS DIL/WSHOT DETER LNG VOL: CPT

## 2022-06-27 PROCEDURE — ZZZZZ: CPT

## 2022-06-27 PROCEDURE — 95012 NITRIC OXIDE EXP GAS DETER: CPT

## 2022-06-27 PROCEDURE — 94729 DIFFUSING CAPACITY: CPT

## 2022-06-27 PROCEDURE — 94010 BREATHING CAPACITY TEST: CPT

## 2022-06-27 NOTE — PHYSICAL EXAM
[No Acute Distress] : no acute distress [Normal Appearance] : normal appearance [Supple] : supple [No Neck Mass] : no neck mass [No JVD] : no jvd [Normal Rate/Rhythm] : normal rate/rhythm [Normal S1, S2] : normal s1, s2 [No Murmurs] : no murmurs [No Rubs] : no rubs [No Gallops] : no gallops [No Resp Distress] : no resp distress [No Acc Muscle Use] : no acc muscle use [Normal Palpation] : normal palpation [Clear to Auscultation Bilaterally] : clear to auscultation bilaterally [Normal to Percussion] : normal to percussion [No Abnormalities] : no abnormalities [Benign] : benign [Not Tender] : not tender [Soft] : soft [No HSM] : no hsm [Normal Bowel Sounds] : normal bowel sounds [Normal Gait] : normal gait [No Clubbing] : no clubbing [No Cyanosis] : no cyanosis [No Edema] : no edema [Normal Color/ Pigmentation] : normal color/ pigmentation [No Focal Deficits] : no focal deficits [Oriented x3] : oriented x3 [Normal Affect] : normal affect [TextBox_2] : Overweight

## 2022-06-27 NOTE — DISCUSSION/SUMMARY
[FreeTextEntry1] : Viral bronchitis\par History asthma\par Recommendations add controller therapy singular 10 mg 1 tablet daily\par Symbicort 160 just 4.5 MCG 2 puffs twice daily and instructions to rinse\par As needed albuterol\par Notify of any wheezing.  Notify if rescue inhaler is needed greater than 2-3 times in any week.  Avoid known triggers.\par Is follow-up 2 weeks\par

## 2022-06-27 NOTE — HISTORY OF PRESENT ILLNESS
[Never] : never [TextBox_4] : out to dinner  2 weeks ago\par awakened chest congestion \par pos wheeze pos  crackle\par  seen at [facility] felt labored breathing txed antibx and medrol \par no CXR performed\par COVID PCR not done\par  several Home Rapid COVID Ag neg\par  seen at f/u for chest congestion and had non Dx CXR \par retxed DOXY p\par + prednisoneand just completed\par Official CXR report [facility] MD [facility] basilar atelectasis\par Positive history of asthma not currently on maintenance for controller therapy\par Stated history of a prior COVID-19 infection

## 2022-06-27 NOTE — PROCEDURE
[FreeTextEntry1] : Chest x-ray PA lateral June 27, 2022\par Cardiac size normal\par Left basilar discoid atelectasis\par Lungs otherwise clear without parenchymal infiltrates pleural effusions or dominant pulmonary nodules no prior chest x-rays available for review\par PFT no bronchodilator June 27, 2022\par Mild obstructive ventilatory impairment\par TLC normal range 3% predicted\par Positive air trapping with RV/TLC ratio 141% predicted\par Diffusion 131% predicted\par Hemoglobin 15.9\par Very mild decline at flow rates not greater than 200 cc\par NIOX 20\par

## 2022-07-11 ENCOUNTER — APPOINTMENT (OUTPATIENT)
Dept: PULMONOLOGY | Facility: CLINIC | Age: 62
End: 2022-07-11

## 2022-07-11 VITALS
SYSTOLIC BLOOD PRESSURE: 123 MMHG | TEMPERATURE: 97.9 F | HEART RATE: 93 BPM | DIASTOLIC BLOOD PRESSURE: 79 MMHG | OXYGEN SATURATION: 94 %

## 2022-07-11 PROCEDURE — 94010 BREATHING CAPACITY TEST: CPT

## 2022-07-11 PROCEDURE — 99214 OFFICE O/P EST MOD 30 MIN: CPT | Mod: 25

## 2022-07-11 RX ORDER — CELECOXIB 200 MG/1
200 CAPSULE ORAL
Qty: 90 | Refills: 0 | Status: ACTIVE | COMMUNITY
Start: 2022-05-05

## 2022-07-11 RX ORDER — CHOLESTYRAMINE 4 G/9G
4 POWDER, FOR SUSPENSION ORAL
Qty: 90 | Refills: 0 | Status: DISCONTINUED | COMMUNITY
Start: 2022-06-01

## 2022-07-11 RX ORDER — BENZONATATE 200 MG/1
200 CAPSULE ORAL
Qty: 21 | Refills: 0 | Status: DISCONTINUED | COMMUNITY
Start: 2022-06-23

## 2022-07-11 RX ORDER — METHYLPREDNISOLONE 4 MG/1
4 TABLET ORAL
Qty: 21 | Refills: 0 | Status: DISCONTINUED | COMMUNITY
Start: 2022-06-14

## 2022-07-11 RX ORDER — DOXYCYCLINE HYCLATE 100 MG/1
100 CAPSULE ORAL
Qty: 14 | Refills: 0 | Status: DISCONTINUED | COMMUNITY
Start: 2022-06-23

## 2022-07-11 RX ORDER — PREDNISONE 20 MG/1
20 TABLET ORAL
Qty: 15 | Refills: 0 | Status: DISCONTINUED | COMMUNITY
Start: 2022-06-23

## 2022-07-11 RX ORDER — AZITHROMYCIN 250 MG/1
250 TABLET, FILM COATED ORAL
Qty: 6 | Refills: 0 | Status: DISCONTINUED | COMMUNITY
Start: 2022-06-14

## 2022-07-11 NOTE — PROCEDURE
[FreeTextEntry1] : 07/11/2022\par Pulmonary function testing\par These data demonstrate a mild obstructive ventilatory deficit. \par Compared to June 27, 2022 there is a mild decrease in FEV1 without significant change in FVC.\par \par \par \par

## 2022-07-11 NOTE — HISTORY OF PRESENT ILLNESS
[TextBox_4] : After last visit with Dr Rivero started Singulair and Symbicort and felt better\par no further wheeze\par \par GERD better with omeprazole at night and Pepcid during day, due to go back to Gi\par Now on Celebrex\par \par still having panic issues if lying on back or stomach,worse since episode in MRI machine\par has chronic back pain ,up multiple tiles a night, poor sleep Does not want to do Sleep study\par \par no wheeze of cough, prior was not using albuterol\par \par Having issues with GERD so now sleeps elevated , on famotidine bid\par \par saw rheum and had labs\par Had COVID in December ,was not tested but everyone had it.

## 2022-07-11 NOTE — DISCUSSION/SUMMARY
[FreeTextEntry1] : Overweight \par Component of anxiety.\par Asthma.  Clinically improved on bronchodilator therapy.\par GERD\par Osteoarthritis\par Renal Stones seeing  also for prostate Lummerman\par HTN controlled\par Hyperlipidemia\par Poor sleep appears related to musculoskeletal issues.  Less likely related to progressive sleep apnea.  Should exclude.

## 2022-07-11 NOTE — ASSESSMENT
[FreeTextEntry1] : Trial of PRN Xanax.  Avoid prior to sleep.\par Consider psych evaluation.  Discussed.\par cont Symbicort and singulair\par Home sleep study.\par Orthopedic follow-up.\par Medications reviewed and renewed.\par \par 35 minutes spent in evaluation management and review of studies.

## 2022-07-26 ENCOUNTER — TRANSCRIPTION ENCOUNTER (OUTPATIENT)
Age: 62
End: 2022-07-26

## 2022-07-26 ENCOUNTER — APPOINTMENT (OUTPATIENT)
Dept: PULMONOLOGY | Facility: CLINIC | Age: 62
End: 2022-07-26

## 2022-07-26 DIAGNOSIS — U07.1 COVID-19: ICD-10-CM

## 2022-07-26 PROCEDURE — 99214 OFFICE O/P EST MOD 30 MIN: CPT | Mod: 95

## 2022-07-27 NOTE — HISTORY OF PRESENT ILLNESS
[Home] : at home, [unfilled] , at the time of the visit. [Medical Office: ([facility])___] : at the medical office located in  [Verbal consent obtained from patient] : the patient, [unfilled] [TextBox_4] : This visit was provided via telehealth using real-time 2-way audio visual technology. The patient, BETTY ABREU , was located at home, [address]\[place] [address]  at the time of the visit.\par The provider, Ankit Singer, was located at office [facility] [place], [place] at the time of the visit. \par \par  The patient, Mr. BETTY ABREU  and Physician Ankit Rebolledo participated in the telehealth encounter.\par \par Verbal consent obtained by  from patient\par \par 1st COVID\par tested for COVID positive today after start of cough yesterday\par woke up with chills 100 fever\par pulse O2 94 hr 91 rr 18\par has COVID double boosted\par Denies significant shortness of breath.\par

## 2022-07-27 NOTE — DISCUSSION/SUMMARY
[FreeTextEntry1] : COVID infection with multiple comorbidities.  As below.\par Overweight \par Asthma.  Clinically improved on bronchodilator therapy.\par GERD\par Osteoarthritis\par Renal Stones seeing  also for prostate Lummerman\par HTN controlled\par Hyperlipidemia\par Poor sleep appears related to musculoskeletal issues.  Less likely related to progressive sleep apnea.  Should exclude.

## 2022-07-27 NOTE — PHYSICAL EXAM
[No Acute Distress] : no acute distress [Supple] : supple [No JVD] : no jvd [Normal to Percussion] : normal to percussion [No Cyanosis] : no cyanosis [Oriented x3] : oriented x3 [Normal Affect] : normal affect [TextBox_2] : Overweight [TextBox_105] : trace edema

## 2022-07-27 NOTE — ASSESSMENT
[FreeTextEntry1] : Due to many drug-drug interactions with paxloivd, will order monoclonal antibody infusion\par start baby asa 81 mg daily\par change to Pepcid bid\par cont vit d and vit c\par Rest home and isolate.\par Maintain hydration orally.\par Follow oxygen saturation.\par cont Symbicort and Singulair\par Home sleep study in future\par will f/u with patient at end of the week\par Medications reviewed and renewed.\par \par 35 minutes spent in evaluation management and review of studies.

## 2022-08-01 ENCOUNTER — TRANSCRIPTION ENCOUNTER (OUTPATIENT)
Age: 62
End: 2022-08-01

## 2022-08-21 ENCOUNTER — RX RENEWAL (OUTPATIENT)
Age: 62
End: 2022-08-21

## 2022-09-10 ENCOUNTER — TRANSCRIPTION ENCOUNTER (OUTPATIENT)
Age: 62
End: 2022-09-10

## 2022-09-13 ENCOUNTER — TRANSCRIPTION ENCOUNTER (OUTPATIENT)
Age: 62
End: 2022-09-13

## 2022-09-14 ENCOUNTER — APPOINTMENT (OUTPATIENT)
Dept: PULMONOLOGY | Facility: CLINIC | Age: 62
End: 2022-09-14

## 2022-09-14 ENCOUNTER — NON-APPOINTMENT (OUTPATIENT)
Age: 62
End: 2022-09-14

## 2022-09-14 PROCEDURE — 95800 SLP STDY UNATTENDED: CPT

## 2022-09-15 ENCOUNTER — RX RENEWAL (OUTPATIENT)
Age: 62
End: 2022-09-15

## 2022-09-15 ENCOUNTER — TRANSCRIPTION ENCOUNTER (OUTPATIENT)
Age: 62
End: 2022-09-15

## 2022-09-15 PROCEDURE — 95800 SLP STDY UNATTENDED: CPT | Mod: 52

## 2022-09-15 NOTE — DISCUSSION/SUMMARY
Call placed to patient's mother for additional information. Spoke to mom who states that this morning patient woke up crying stating that her feet hurt. Mom states that patient will cry every time she begins to walk and has been attempting to walk on her tip-toes, however \"falls in pain\" every time. When mom asks if her feet her patient responds with \"yes,\" however cannot point to where specifically it hurts.     Denies redness, bruising, or swelling to feet/legs. Per mom, patient does not have any UR symptoms, GI symptoms, or fevers and is acting fine otherwise.     No appointments available at [facility] today. Advised that message would be sent to Dr. Henry to review and this RN would call back with further recommendations.     To Dr. Henry - please review and advise.            [FreeTextEntry1] : Overweight \par Asthma.  No significant change in flow rates.  More likely nocturnal symptoms are related to anxiety however component of airways disease cannot be excluded.  Could also be related to reflux.  Does have elevated NIOX.\par Osteoarthritis\par Renal Stones seeing  also for prostate Lummerman\par HTN controlled\par Hyperlipidemia\par Poor sleep appears related to musculoskeletal issues.  Less likely related to progressive sleep apnea.

## 2022-09-30 ENCOUNTER — APPOINTMENT (OUTPATIENT)
Dept: PULMONOLOGY | Facility: CLINIC | Age: 62
End: 2022-09-30

## 2022-09-30 VITALS — DIASTOLIC BLOOD PRESSURE: 87 MMHG | SYSTOLIC BLOOD PRESSURE: 128 MMHG

## 2022-09-30 VITALS — HEART RATE: 101 BPM | DIASTOLIC BLOOD PRESSURE: 87 MMHG | OXYGEN SATURATION: 92 % | SYSTOLIC BLOOD PRESSURE: 128 MMHG

## 2022-09-30 DIAGNOSIS — Z23 ENCOUNTER FOR IMMUNIZATION: ICD-10-CM

## 2022-09-30 PROCEDURE — G0008: CPT

## 2022-09-30 PROCEDURE — 94010 BREATHING CAPACITY TEST: CPT

## 2022-09-30 PROCEDURE — 99213 OFFICE O/P EST LOW 20 MIN: CPT | Mod: 25

## 2022-09-30 PROCEDURE — 90686 IIV4 VACC NO PRSV 0.5 ML IM: CPT

## 2022-09-30 NOTE — PROCEDURE
[FreeTextEntry1] : discussed 2 night HHS 9/14/22.  Moderate IVY with AHI of 21\par \par 09/30/2022\par Pulmonary function testing\par FEV1, FVC, and FEV1/FVC are within normal limits. \par Stable flow rates.\par \par

## 2022-09-30 NOTE — DISCUSSION/SUMMARY
[FreeTextEntry1] : Overweight \par Component of anxiety.\par Asthma.  Clinically improved on bronchodilator therapy.\par GERD\par Osteoarthritis\par Renal Stones seeing  also for prostate Lummerman\par HTN controlled\par Hyperlipidemia\par IVY moderate

## 2022-09-30 NOTE — ASSESSMENT
[FreeTextEntry1] :  PRN Xanax.  Avoid prior to sleep.\par psych evaluation.  Discussed. and gave number\par cont Symbicort and Singulair\par  will get MAD referred to dentist\par Weight loss recommended and discussed.\par \par r/t PE 3-4 months\par \par Medications reviewed and renewed.\par \par 25 minutes spent in evaluation management and review of studies.

## 2022-09-30 NOTE — HISTORY OF PRESENT ILLNESS
[TextBox_4] : Had 2 night HHS\par \par using rare xanax for anxiety especially with procedures\par using Symbicort and Singulair, breathing is well\par \par has failed cpap in past\par \par had recent labs from rheum, normal lfts

## 2022-10-15 ENCOUNTER — RX RENEWAL (OUTPATIENT)
Age: 62
End: 2022-10-15

## 2022-10-21 ENCOUNTER — RX RENEWAL (OUTPATIENT)
Age: 62
End: 2022-10-21

## 2022-10-23 ENCOUNTER — RX RENEWAL (OUTPATIENT)
Age: 62
End: 2022-10-23

## 2022-10-27 NOTE — ED ADULT TRIAGE NOTE - TEMPERATURE IN CELSIUS (DEGREES C)
October 29, 2022         [address] [place] [address] [place] [address]  Phone: [phone]  Fax: [phone]       Patient: Sarbjit Brewer   YOB: 1961  Date of Visit: 10/29/2022    To Whom It May Concern:    Dutch Brewer  was at [facility] on 10/29/2022. The patient's wife Elise Brewer, who was his carer during this time, may return to work on 10/30/22 without restrictions. If you have any questions or concerns, or if I can be of further assistance, please do not hesitate to contact me.    Sincerely,        Harmony Matthews MD     
36.9

## 2022-11-04 NOTE — HISTORY OF PRESENT ILLNESS
[TextBox_4] : Had knee Sx July 2021, since then has had intermittent left leg swelling .worse after sitting for long periods. Saw ortho and told knee ok\par No asthma c/o\par Swelling is bilateral but left sign. greater than right. Progressive over day. \par \par Otherwise feeling relatively well. Marciano Daily

## 2022-11-17 ENCOUNTER — NON-APPOINTMENT (OUTPATIENT)
Age: 62
End: 2022-11-17

## 2023-01-10 ENCOUNTER — TRANSCRIPTION ENCOUNTER (OUTPATIENT)
Age: 63
End: 2023-01-10

## 2023-02-24 ENCOUNTER — APPOINTMENT (OUTPATIENT)
Dept: PULMONOLOGY | Facility: CLINIC | Age: 63
End: 2023-02-24
Payer: COMMERCIAL

## 2023-02-24 ENCOUNTER — LABORATORY RESULT (OUTPATIENT)
Age: 63
End: 2023-02-24

## 2023-02-24 VITALS
HEART RATE: 100 BPM | OXYGEN SATURATION: 95 % | WEIGHT: 315 LBS | SYSTOLIC BLOOD PRESSURE: 148 MMHG | HEIGHT: 74 IN | DIASTOLIC BLOOD PRESSURE: 89 MMHG | BODY MASS INDEX: 40.43 KG/M2

## 2023-02-24 DIAGNOSIS — Z01.818 ENCOUNTER FOR OTHER PREPROCEDURAL EXAMINATION: ICD-10-CM

## 2023-02-24 DIAGNOSIS — Z00.00 ENCOUNTER FOR GENERAL ADULT MEDICAL EXAMINATION W/OUT ABNORMAL FINDINGS: ICD-10-CM

## 2023-02-24 LAB
ALBUMIN: 10
BILIRUB UR QL STRIP: NORMAL
CLARITY UR: CLEAR
COLLECTION METHOD: NORMAL
CREATININE: 200
GLUCOSE UR-MCNC: NORMAL
HCG UR QL: 0.2 EU/DL
HGB UR QL STRIP.AUTO: NORMAL
KETONES UR-MCNC: NORMAL
LEUKOCYTE ESTERASE UR QL STRIP: NORMAL
MICROALBUMIN/CREAT UR TEST STR-RTO: <30
NITRITE UR QL STRIP: NORMAL
PH UR STRIP: 5.5
POCT - HEMOGLOBIN (HGB), QUANTITATIVE, TRANSCUTANEOUS: 17.6
PROT UR STRIP-MCNC: NORMAL
SP GR UR STRIP: 1.02

## 2023-02-24 PROCEDURE — 81003 URINALYSIS AUTO W/O SCOPE: CPT | Mod: QW

## 2023-02-24 PROCEDURE — 82044 UR ALBUMIN SEMIQUANTITATIVE: CPT | Mod: QW

## 2023-02-24 PROCEDURE — ZZZZZ: CPT

## 2023-02-24 PROCEDURE — 88738 HGB QUANT TRANSCUTANEOUS: CPT

## 2023-02-24 PROCEDURE — 94010 BREATHING CAPACITY TEST: CPT

## 2023-02-24 PROCEDURE — 36415 COLL VENOUS BLD VENIPUNCTURE: CPT

## 2023-02-24 PROCEDURE — 94729 DIFFUSING CAPACITY: CPT

## 2023-02-24 PROCEDURE — 94727 GAS DIL/WSHOT DETER LNG VOL: CPT

## 2023-02-24 PROCEDURE — 99214 OFFICE O/P EST MOD 30 MIN: CPT | Mod: 25

## 2023-02-25 NOTE — DISCUSSION/SUMMARY
[FreeTextEntry1] : Preop for repair of left knee replacement.\par Overweight \par Component of anxiety.\par Asthma.  Clinically improved on bronchodilator therapy.\par GERD\par Osteoarthritis\par Renal Stones seeing  also for prostate Lummerman\par HTN with mild elevation of blood pressure today.\par Hyperlipidemia\par IVY moderate

## 2023-02-25 NOTE — ASSESSMENT
[FreeTextEntry1] : \par \par cont Symbicort and Singulair\par cont MAD , will r/t for 2 night HHS on MAD\par Weight loss recommended and discussed.\par Medications reviewed and renewed.\par \par \par Cardiology clearance Dr. Reynolds\par \par 35 minutes spent in evaluation management and review of studies.\par \par \par Medical problems as above. Medical status maximized. No medical contraindications to surgery.\par

## 2023-02-25 NOTE — HISTORY OF PRESENT ILLNESS
[TextBox_4] : \par here for medical clearance for repair if a left knee replacement done 2/12 years ago,on March 21 st, Dr. Joe Rodriguez [facility] fax [phone]\par also due for physical\par going for colonoscopy this week\par eye md recent\par derm recent\par just had endoscopy\par  Uro recent had PSA\par \par using rare xanax for anxiety especially with procedures\par using Symbicort and Singulair, breathing is well\par \par has failed cpap in past\par \par Has appt for cardiology next week. Dr. Fan.

## 2023-02-25 NOTE — PROCEDURE
[FreeTextEntry1] : discussed 2 night HHS 9/14/22.  Moderate IVY with AHI of 21\par \par 02/24/2023\par Pulmonary function testing\par These data demonstrate a mild obstructive ventilatory deficit. Normal Lung Volumes. Diffusion capacity is normal. \par

## 2023-02-25 NOTE — PHYSICAL EXAM
[No Acute Distress] : no acute distress [Normal Oropharynx] : normal oropharynx [Supple] : supple [No JVD] : no jvd [Normal S1, S2] : normal s1, s2 [No Murmurs] : no murmurs [Clear to Auscultation Bilaterally] : clear to auscultation bilaterally [Normal to Percussion] : normal to percussion [Benign] : benign [No HSM] : no hsm [No Clubbing] : no clubbing [No Cyanosis] : no cyanosis [No Focal Deficits] : no focal deficits [Oriented x3] : oriented x3 [Normal Affect] : normal affect [No Abnormalities] : no abnormalities [Not Tender] : not tender [TextBox_2] : Overweight [TextBox_105] : trace edema

## 2023-02-27 LAB
25(OH)D3 SERPL-MCNC: 31.6 NG/ML
ALBUMIN SERPL ELPH-MCNC: 4.7 G/DL
ALP BLD-CCNC: 84 U/L
ALT SERPL-CCNC: 32 U/L
ANION GAP SERPL CALC-SCNC: 18 MMOL/L
APTT BLD: 29.5 SEC
AST SERPL-CCNC: 18 U/L
BASOPHILS # BLD AUTO: 0.02 K/UL
BASOPHILS NFR BLD AUTO: 0.3 %
BILIRUB SERPL-MCNC: 0.3 MG/DL
BUN SERPL-MCNC: 14 MG/DL
CALCIUM SERPL-MCNC: 9.7 MG/DL
CHLORIDE SERPL-SCNC: 102 MMOL/L
CHOLEST SERPL-MCNC: 173 MG/DL
CO2 SERPL-SCNC: 23 MMOL/L
CREAT SERPL-MCNC: 0.86 MG/DL
EGFR: 99 ML/MIN/1.73M2
EOSINOPHIL # BLD AUTO: 0.1 K/UL
EOSINOPHIL NFR BLD AUTO: 1.4 %
ESTIMATED AVERAGE GLUCOSE: 114 MG/DL
GLUCOSE SERPL-MCNC: 103 MG/DL
HBA1C MFR BLD HPLC: 5.6 %
HCT VFR BLD CALC: 45.4 %
HDLC SERPL-MCNC: 51 MG/DL
HGB BLD-MCNC: 15.6 G/DL
IMM GRANULOCYTES NFR BLD AUTO: 0.3 %
INR PPP: 1 RATIO
LDLC SERPL CALC-MCNC: 78 MG/DL
LYMPHOCYTES # BLD AUTO: 1.67 K/UL
LYMPHOCYTES NFR BLD AUTO: 24 %
MAN DIFF?: NORMAL
MCHC RBC-ENTMCNC: 32.5 PG
MCHC RBC-ENTMCNC: 34.4 GM/DL
MCV RBC AUTO: 94.6 FL
MONOCYTES # BLD AUTO: 0.54 K/UL
MONOCYTES NFR BLD AUTO: 7.8 %
NEUTROPHILS # BLD AUTO: 4.61 K/UL
NEUTROPHILS NFR BLD AUTO: 66.2 %
NONHDLC SERPL-MCNC: 122 MG/DL
PLATELET # BLD AUTO: 256 K/UL
POTASSIUM SERPL-SCNC: 4.4 MMOL/L
PROT SERPL-MCNC: 7.3 G/DL
PSA SERPL-MCNC: 3.13 NG/ML
PT BLD: 11.6 SEC
RBC # BLD: 4.8 M/UL
RBC # FLD: 13 %
SODIUM SERPL-SCNC: 142 MMOL/L
T3 SERPL-MCNC: 92 NG/DL
T3RU NFR SERPL: 1 TBI
T4 FREE SERPL-MCNC: 1.5 NG/DL
T4 SERPL-MCNC: 8.4 UG/DL
TRIGL SERPL-MCNC: 220 MG/DL
TSH SERPL-ACNC: 0.77 UIU/ML
WBC # FLD AUTO: 6.96 K/UL

## 2023-03-01 ENCOUNTER — RX RENEWAL (OUTPATIENT)
Age: 63
End: 2023-03-01

## 2023-04-24 ENCOUNTER — RX RENEWAL (OUTPATIENT)
Age: 63
End: 2023-04-24

## 2023-04-24 RX ORDER — ALBUTEROL SULFATE 90 UG/1
108 (90 BASE) INHALANT RESPIRATORY (INHALATION)
Qty: 25.5 | Refills: 3 | Status: ACTIVE | COMMUNITY
Start: 2018-09-28 | End: 1900-01-01

## 2023-04-25 NOTE — H&P PST ADULT - DENTITION
Dupixent Pregnancy And Lactation Text: This medication likely crosses the placenta but the risk for the fetus is uncertain. This medication is excreted in breast milk. denies loose teeth/normal

## 2023-04-27 NOTE — ASU DISCHARGE PLAN (ADULT/PEDIATRIC) - PROCEDURE
S/P Excision of mass of right lower back Detail Level: Zone Render In Strict Bullet Format?: No Plan: Prn clobetasol m-f

## 2023-05-18 ENCOUNTER — NON-APPOINTMENT (OUTPATIENT)
Age: 63
End: 2023-05-18

## 2023-05-25 ENCOUNTER — APPOINTMENT (OUTPATIENT)
Dept: INTERNAL MEDICINE | Facility: CLINIC | Age: 63
End: 2023-05-25
Payer: COMMERCIAL

## 2023-05-25 VITALS
OXYGEN SATURATION: 94 % | SYSTOLIC BLOOD PRESSURE: 130 MMHG | TEMPERATURE: 98.2 F | HEART RATE: 83 BPM | BODY MASS INDEX: 40.43 KG/M2 | HEIGHT: 74 IN | WEIGHT: 315 LBS | DIASTOLIC BLOOD PRESSURE: 80 MMHG

## 2023-05-25 LAB
ALBUMIN SERPL ELPH-MCNC: 4.4 G/DL
ALP BLD-CCNC: 77 U/L
ALT SERPL-CCNC: 21 U/L
ANION GAP SERPL CALC-SCNC: 17 MMOL/L
AST SERPL-CCNC: 25 U/L
BILIRUB SERPL-MCNC: 0.3 MG/DL
BUN SERPL-MCNC: 16 MG/DL
CALCIUM SERPL-MCNC: 9.7 MG/DL
CHLORIDE SERPL-SCNC: 105 MMOL/L
CO2 SERPL-SCNC: 15 MMOL/L
CREAT SERPL-MCNC: 0.91 MG/DL
EGFR: 96 ML/MIN/1.73M2
FOLATE SERPL-MCNC: >20 NG/ML
GLUCOSE SERPL-MCNC: 96 MG/DL
POTASSIUM SERPL-SCNC: 4.7 MMOL/L
PROT SERPL-MCNC: 7.1 G/DL
SODIUM SERPL-SCNC: 138 MMOL/L
VIT B12 SERPL-MCNC: 395 PG/ML

## 2023-05-25 PROCEDURE — 99204 OFFICE O/P NEW MOD 45 MIN: CPT | Mod: 25

## 2023-05-25 PROCEDURE — G0447 BEHAVIOR COUNSEL OBESITY 15M: CPT | Mod: 59

## 2023-05-25 RX ORDER — QUINAPRIL HYDROCHLORIDE 20 MG/1
20 TABLET, FILM COATED ORAL
Qty: 90 | Refills: 0 | Status: DISCONTINUED | COMMUNITY
Start: 2021-03-01 | End: 2023-05-25

## 2023-05-25 RX ORDER — CYCLOBENZAPRINE HYDROCHLORIDE 10 MG/1
10 TABLET, FILM COATED ORAL
Qty: 90 | Refills: 0 | Status: DISCONTINUED | COMMUNITY
Start: 2022-05-21 | End: 2023-05-25

## 2023-05-25 RX ORDER — NIRMATRELVIR AND RITONAVIR 300-100 MG
20 X 150 MG & KIT ORAL
Qty: 1 | Refills: 0 | Status: DISCONTINUED | COMMUNITY
Start: 2022-07-27 | End: 2023-05-25

## 2023-05-28 NOTE — HISTORY OF PRESENT ILLNESS
[FreeTextEntry1] : new pcp, establish care [de-identified] : BETTY ABREU is a 62 year old male with PMH of anxiety, IVY on CPAP, asthma, OA, kidney stones, GERD, HTn, HLD, mitral valve prolapse presented to the office today for general checkup and establish care\par pins and needle for b/l LE chronic for years\par Seess cards Dr Reynolds at [facility] recently had ekg and tte, march 2023

## 2023-05-28 NOTE — REVIEW OF SYSTEMS
[Negative] : Heme/Lymph [Headache] : no headache [Dizziness] : no dizziness [Fainting] : no fainting [Confusion] : no confusion [Unsteady Walk] : no ataxia [Memory Loss] : no memory loss

## 2023-05-28 NOTE — HEALTH RISK ASSESSMENT
[0] : 2) Feeling down, depressed, or hopeless: Not at all (0) [PHQ-2 Negative - No further assessment needed] : PHQ-2 Negative - No further assessment needed [Never] : Never [[id number]] : 0

## 2023-05-28 NOTE — PHYSICAL EXAM
[No Acute Distress] : no acute distress [Well Nourished] : well nourished [Well Developed] : well developed [Well-Appearing] : well-appearing [Normal Sclera/Conjunctiva] : normal sclera/conjunctiva [EOMI] : extraocular movements intact [Normal Outer Ear/Nose] : the outer ears and nose were normal in appearance [Normal Oropharynx] : the oropharynx was normal [No JVD] : no jugular venous distention [No Lymphadenopathy] : no lymphadenopathy [Supple] : supple [No Respiratory Distress] : no respiratory distress  [No Accessory Muscle Use] : no accessory muscle use [Clear to Auscultation] : lungs were clear to auscultation bilaterally [Normal Rate] : normal rate  [Regular Rhythm] : with a regular rhythm [Normal S1, S2] : normal S1 and S2 [No Murmur] : no murmur heard [No Carotid Bruits] : no carotid bruits [No Varicosities] : no varicosities [Pedal Pulses Present] : the pedal pulses are present [No Edema] : there was no peripheral edema [No Extremity Clubbing/Cyanosis] : no extremity clubbing/cyanosis [Soft] : abdomen soft [Non Tender] : non-tender [Non-distended] : non-distended [No HSM] : no HSM [Normal Bowel Sounds] : normal bowel sounds [Normal Anterior Cervical Nodes] : no anterior cervical lymphadenopathy [No CVA Tenderness] : no CVA  tenderness [No Spinal Tenderness] : no spinal tenderness [No Joint Swelling] : no joint swelling [Grossly Normal Strength/Tone] : grossly normal strength/tone [No Rash] : no rash [Coordination Grossly Intact] : coordination grossly intact [No Focal Deficits] : no focal deficits [Normal Gait] : normal gait [Normal Affect] : the affect was normal [Alert and Oriented x3] : oriented to person, place, and time [PERRL] : pupils equal round and reactive to light [Normal Insight/Judgement] : insight and judgment were intact

## 2023-06-04 ENCOUNTER — NON-APPOINTMENT (OUTPATIENT)
Age: 63
End: 2023-06-04

## 2023-06-08 ENCOUNTER — APPOINTMENT (OUTPATIENT)
Dept: VASCULAR SURGERY | Facility: CLINIC | Age: 63
End: 2023-06-08
Payer: COMMERCIAL

## 2023-06-08 VITALS
HEIGHT: 74 IN | WEIGHT: 315 LBS | HEART RATE: 97 BPM | SYSTOLIC BLOOD PRESSURE: 146 MMHG | DIASTOLIC BLOOD PRESSURE: 89 MMHG | BODY MASS INDEX: 40.43 KG/M2 | TEMPERATURE: 98 F

## 2023-06-08 PROCEDURE — 99203 OFFICE O/P NEW LOW 30 MIN: CPT

## 2023-08-24 ENCOUNTER — RX RENEWAL (OUTPATIENT)
Age: 63
End: 2023-08-24

## 2023-08-25 ENCOUNTER — RX RENEWAL (OUTPATIENT)
Age: 63
End: 2023-08-25

## 2023-08-29 NOTE — ASU PATIENT PROFILE, ADULT - NS PRO ABUSE SCREEN SUSPICION NEGLECT YN
Detail Level: Detailed Depth Of Biopsy: dermis Was A Bandage Applied: Yes Size Of Lesion In Cm: 0 Biopsy Type: H and E Biopsy Method: Dermablade Anesthesia Type: 0.5% lidocaine with 1:200,000 epinephrine and a 1:10 solution of 8.4% sodium bicarbonate Anesthesia Volume In Cc: 0.5 Hemostasis: Aluminum Chloride Wound Care: Vaseline no Dressing: bandage Destruction After The Procedure: No Type Of Destruction Used: Curettage Curettage Text: The wound bed was treated with curettage after the biopsy was performed. Cryotherapy Text: The wound bed was treated with cryotherapy after the biopsy was performed. Electrodesiccation Text: The wound bed was treated with electrodesiccation after the biopsy was performed. Electrodesiccation And Curettage Text: The wound bed was treated with electrodesiccation and curettage after the biopsy was performed. Silver Nitrate Text: The wound bed was treated with silver nitrate after the biopsy was performed. Lab: -[id number] Consent: Written consent was obtained and risks were reviewed including but not limited to scarring, infection, bleeding, scabbing, incomplete removal, nerve damage and allergy to anesthesia. Post-Care Instructions: I reviewed with the patient in detail post-care instructions. Patient is to keep the biopsy site dry overnight, and then apply bacitracin twice daily until healed. Patient may apply hydrogen peroxide soaks to remove any crusting. Notification Instructions: Patient will be notified of biopsy results. However, patient instructed to call the office if not contacted within 2 weeks. Billing Type: Third-Party Bill Information: Selecting Yes will display possible errors in your note based on the variables you have selected. This validation is only offered as a suggestion for you. PLEASE NOTE THAT THE VALIDATION TEXT WILL BE REMOVED WHEN YOU FINALIZE YOUR NOTE. IF YOU WANT TO FAX A PRELIMINARY NOTE YOU WILL NEED TO TOGGLE THIS TO 'NO' IF YOU DO NOT WANT IT IN YOUR FAXED NOTE.

## 2023-09-19 ENCOUNTER — NON-APPOINTMENT (OUTPATIENT)
Age: 63
End: 2023-09-19

## 2023-09-19 ENCOUNTER — APPOINTMENT (OUTPATIENT)
Dept: INTERNAL MEDICINE | Facility: CLINIC | Age: 63
End: 2023-09-19
Payer: COMMERCIAL

## 2023-09-19 VITALS — SYSTOLIC BLOOD PRESSURE: 118 MMHG | DIASTOLIC BLOOD PRESSURE: 70 MMHG

## 2023-09-19 VITALS
OXYGEN SATURATION: 97 % | HEART RATE: 91 BPM | SYSTOLIC BLOOD PRESSURE: 140 MMHG | DIASTOLIC BLOOD PRESSURE: 82 MMHG | HEIGHT: 74 IN | WEIGHT: 315 LBS | BODY MASS INDEX: 40.43 KG/M2 | TEMPERATURE: 98.3 F

## 2023-09-19 DIAGNOSIS — R20.0 ANESTHESIA OF SKIN: ICD-10-CM

## 2023-09-19 DIAGNOSIS — Z13.6 ENCOUNTER FOR SCREENING FOR CARDIOVASCULAR DISORDERS: ICD-10-CM

## 2023-09-19 DIAGNOSIS — F41.9 ANXIETY DISORDER, UNSPECIFIED: ICD-10-CM

## 2023-09-19 DIAGNOSIS — R20.2 ANESTHESIA OF SKIN: ICD-10-CM

## 2023-09-19 DIAGNOSIS — R79.89 OTHER SPECIFIED ABNORMAL FINDINGS OF BLOOD CHEMISTRY: ICD-10-CM

## 2023-09-19 PROCEDURE — 99214 OFFICE O/P EST MOD 30 MIN: CPT | Mod: 25

## 2023-09-19 PROCEDURE — 99204 OFFICE O/P NEW MOD 45 MIN: CPT | Mod: 25

## 2023-09-19 PROCEDURE — 93000 ELECTROCARDIOGRAM COMPLETE: CPT | Mod: 59

## 2023-09-19 PROCEDURE — G0447 BEHAVIOR COUNSEL OBESITY 15M: CPT | Mod: 59

## 2023-09-21 ENCOUNTER — TRANSCRIPTION ENCOUNTER (OUTPATIENT)
Age: 63
End: 2023-09-21

## 2023-09-27 ENCOUNTER — APPOINTMENT (OUTPATIENT)
Dept: CT IMAGING | Facility: CLINIC | Age: 63
End: 2023-09-27
Payer: COMMERCIAL

## 2023-09-27 ENCOUNTER — TRANSCRIPTION ENCOUNTER (OUTPATIENT)
Age: 63
End: 2023-09-27

## 2023-09-27 PROCEDURE — 75571 CT HRT W/O DYE W/CA TEST: CPT

## 2023-10-04 ENCOUNTER — NON-APPOINTMENT (OUTPATIENT)
Age: 63
End: 2023-10-04

## 2023-10-05 ENCOUNTER — OFFICE (OUTPATIENT)
Dept: URBAN - METROPOLITAN AREA CLINIC 35 | Facility: CLINIC | Age: 63
Setting detail: OPHTHALMOLOGY
End: 2023-10-05
Payer: COMMERCIAL

## 2023-10-05 ENCOUNTER — TRANSCRIPTION ENCOUNTER (OUTPATIENT)
Age: 63
End: 2023-10-05

## 2023-10-05 DIAGNOSIS — H25.13: ICD-10-CM

## 2023-10-05 DIAGNOSIS — H02.132: ICD-10-CM

## 2023-10-05 DIAGNOSIS — H02.831: ICD-10-CM

## 2023-10-05 DIAGNOSIS — Q14.1: ICD-10-CM

## 2023-10-05 DIAGNOSIS — H02.822: ICD-10-CM

## 2023-10-05 DIAGNOSIS — H02.135: ICD-10-CM

## 2023-10-05 DIAGNOSIS — H02.834: ICD-10-CM

## 2023-10-05 PROCEDURE — 92004 COMPRE OPH EXAM NEW PT 1/>: CPT | Performed by: OPHTHALMOLOGY

## 2023-10-05 PROCEDURE — 92201 OPSCPY EXTND RTA DRAW UNI/BI: CPT | Performed by: OPHTHALMOLOGY

## 2023-10-05 ASSESSMENT — REFRACTION_AUTOREFRACTION
OD_SPHERE: -0.25
OS_AXIS: 125
OS_SPHERE: -0.50
OD_AXIS: 085
OS_CYLINDER: -0.25
OD_CYLINDER: -0.50

## 2023-10-05 ASSESSMENT — REFRACTION_CURRENTRX
OS_OVR_VA: 20/
OD_OVR_VA: 20/

## 2023-10-05 ASSESSMENT — AXIALLENGTH_DERIVED
OS_AL: 23.6519
OD_AL: 23.9292
OD_AL: 23.9292

## 2023-10-05 ASSESSMENT — KERATOMETRY
OD_AXISANGLE_DEGREES: 180
OS_K2POWER_DIOPTERS: 44.75
OS_K1POWER_DIOPTERS: 43.25
OD_K2POWER_DIOPTERS: 43.25
OD_K1POWER_DIOPTERS: 43.00
OS_AXISANGLE_DEGREES: 071

## 2023-10-05 ASSESSMENT — REFRACTION_MANIFEST
OD_VA1: 20/20
OD_SPHERE: -0.25
OD_AXIS: 085
OS_CYLINDER: SPHERE
OD_CYLINDER: -0.50
OS_SPHERE: -0.50
OS_VA1: 20/25+3

## 2023-10-05 ASSESSMENT — LID POSITION - DERMATOCHALASIS
OS_DERMATOCHALASIS: LUL 2+
OD_DERMATOCHALASIS: RUL 2+

## 2023-10-05 ASSESSMENT — TONOMETRY
OD_IOP_MMHG: 16
OS_IOP_MMHG: 16

## 2023-10-05 ASSESSMENT — VISUAL ACUITY
OS_BCVA: 20/40+2
OD_BCVA: 20/25+1

## 2023-10-05 ASSESSMENT — SPHEQUIV_DERIVED
OD_SPHEQUIV: -0.5
OD_SPHEQUIV: -0.5
OS_SPHEQUIV: -0.625

## 2023-10-05 ASSESSMENT — LID POSITION - ECTROPION
OD_ECTROPION: RLL 1+
OS_ECTROPION: LLL 1+

## 2023-10-05 ASSESSMENT — LID POSITION - COMMENTS
OD_COMMENTS: SCLERAL SHOW
OS_COMMENTS: SCLERAL SHOW

## 2023-10-05 ASSESSMENT — CONFRONTATIONAL VISUAL FIELD TEST (CVF)
OD_FINDINGS: FULL
OS_FINDINGS: FULL

## 2023-10-06 ENCOUNTER — TRANSCRIPTION ENCOUNTER (OUTPATIENT)
Age: 63
End: 2023-10-06

## 2023-10-06 ENCOUNTER — APPOINTMENT (OUTPATIENT)
Dept: PULMONOLOGY | Facility: CLINIC | Age: 63
End: 2023-10-06
Payer: COMMERCIAL

## 2023-10-06 VITALS — DIASTOLIC BLOOD PRESSURE: 70 MMHG | SYSTOLIC BLOOD PRESSURE: 106 MMHG | HEART RATE: 89 BPM | OXYGEN SATURATION: 95 %

## 2023-10-06 DIAGNOSIS — Z23 ENCOUNTER FOR IMMUNIZATION: ICD-10-CM

## 2023-10-06 DIAGNOSIS — R91.8 OTHER NONSPECIFIC ABNORMAL FINDING OF LUNG FIELD: ICD-10-CM

## 2023-10-06 PROCEDURE — 71046 X-RAY EXAM CHEST 2 VIEWS: CPT

## 2023-10-06 PROCEDURE — G0008: CPT

## 2023-10-06 PROCEDURE — 99214 OFFICE O/P EST MOD 30 MIN: CPT | Mod: 25

## 2023-10-06 PROCEDURE — 90686 IIV4 VACC NO PRSV 0.5 ML IM: CPT

## 2023-10-17 ENCOUNTER — RX RENEWAL (OUTPATIENT)
Age: 63
End: 2023-10-17

## 2023-10-17 ENCOUNTER — TRANSCRIPTION ENCOUNTER (OUTPATIENT)
Age: 63
End: 2023-10-17

## 2023-10-17 RX ORDER — PANTOPRAZOLE 40 MG/1
40 TABLET, DELAYED RELEASE ORAL
Qty: 90 | Refills: 3 | Status: ACTIVE | COMMUNITY
Start: 2022-03-21 | End: 1900-01-01

## 2023-10-19 ENCOUNTER — APPOINTMENT (OUTPATIENT)
Dept: INTERNAL MEDICINE | Facility: CLINIC | Age: 63
End: 2023-10-19

## 2023-11-01 ENCOUNTER — TRANSCRIPTION ENCOUNTER (OUTPATIENT)
Age: 63
End: 2023-11-01

## 2023-11-15 ENCOUNTER — TRANSCRIPTION ENCOUNTER (OUTPATIENT)
Age: 63
End: 2023-11-15

## 2023-11-22 ENCOUNTER — TRANSCRIPTION ENCOUNTER (OUTPATIENT)
Age: 63
End: 2023-11-22

## 2023-11-22 RX ORDER — FLUTICASONE PROPIONATE AND SALMETEROL 250; 50 UG/1; UG/1
250-50 POWDER RESPIRATORY (INHALATION)
Qty: 3 | Refills: 1 | Status: ACTIVE | COMMUNITY
Start: 2023-11-22 | End: 1900-01-01

## 2023-11-27 ENCOUNTER — APPOINTMENT (OUTPATIENT)
Dept: INTERNAL MEDICINE | Facility: CLINIC | Age: 63
End: 2023-11-27
Payer: COMMERCIAL

## 2023-11-27 VITALS — WEIGHT: 307 LBS | BODY MASS INDEX: 39.42 KG/M2

## 2023-11-27 PROCEDURE — 99213 OFFICE O/P EST LOW 20 MIN: CPT | Mod: 95

## 2023-11-27 RX ORDER — ROSUVASTATIN CALCIUM 5 MG/1
5 TABLET, FILM COATED ORAL
Qty: 1 | Refills: 0 | Status: ACTIVE | COMMUNITY
Start: 2023-11-27

## 2023-11-27 RX ORDER — GABAPENTIN 600 MG/1
600 TABLET, COATED ORAL
Qty: 270 | Refills: 0 | Status: DISCONTINUED | COMMUNITY
Start: 2023-09-19 | End: 2023-11-27

## 2023-11-27 RX ORDER — LOVASTATIN 10 MG/1
10 TABLET ORAL
Qty: 90 | Refills: 3 | Status: DISCONTINUED | COMMUNITY
Start: 2021-03-03 | End: 2023-11-27

## 2023-11-27 RX ORDER — PREGABALIN 225 MG/1
225 CAPSULE ORAL TWICE DAILY
Qty: 1 | Refills: 0 | Status: ACTIVE | COMMUNITY
Start: 2023-11-27

## 2023-12-13 ENCOUNTER — APPOINTMENT (OUTPATIENT)
Dept: ENDOCRINOLOGY | Facility: CLINIC | Age: 63
End: 2023-12-13
Payer: COMMERCIAL

## 2023-12-13 VITALS
HEART RATE: 94 BPM | OXYGEN SATURATION: 95 % | DIASTOLIC BLOOD PRESSURE: 66 MMHG | BODY MASS INDEX: 38.77 KG/M2 | TEMPERATURE: 98.5 F | WEIGHT: 302.13 LBS | SYSTOLIC BLOOD PRESSURE: 110 MMHG | HEIGHT: 74 IN

## 2023-12-13 PROCEDURE — 99204 OFFICE O/P NEW MOD 45 MIN: CPT

## 2023-12-13 NOTE — HISTORY OF PRESENT ILLNESS
[FreeTextEntry1] : 63 year M here for assessment of obesity and pre-DM  Follows a special diet: Calorie restricted/ Low Cholesterol  Food Cravings: Less on wegovy Tried to lose weight before: Yes   Tried any diet plans/ meal replacements for weight control: No Tried OTC or prescription medication for weight control: No Activity level: typically  light activity Ease of skin bruising: No Proximal muscle weakness: No Prior weight loss surgery: No     History of the following:   Thyroid disease: No Heart disease: No Mood disorder: No Hypertension: yes Seizures: No Gall bladder disease: s/p cholecystectomy Known diabetic retinopathy: No  Pancreatitis: No Organ transplant: No

## 2023-12-15 ENCOUNTER — APPOINTMENT (OUTPATIENT)
Dept: INTERNAL MEDICINE | Facility: CLINIC | Age: 63
End: 2023-12-15
Payer: COMMERCIAL

## 2023-12-15 ENCOUNTER — APPOINTMENT (OUTPATIENT)
Dept: INTERNAL MEDICINE | Facility: CLINIC | Age: 63
End: 2023-12-15

## 2023-12-15 DIAGNOSIS — U07.1 COVID-19: ICD-10-CM

## 2023-12-15 PROCEDURE — 99213 OFFICE O/P EST LOW 20 MIN: CPT | Mod: 95

## 2023-12-15 RX ORDER — ALPRAZOLAM 0.25 MG/1
0.25 TABLET ORAL
Qty: 30 | Refills: 0 | Status: DISCONTINUED | COMMUNITY
Start: 2022-07-11 | End: 2023-12-15

## 2023-12-15 RX ORDER — ALFUZOSIN HYDROCHLORIDE 10 MG/1
10 TABLET, EXTENDED RELEASE ORAL
Qty: 90 | Refills: 0 | Status: DISCONTINUED | COMMUNITY
Start: 2022-06-28 | End: 2023-12-15

## 2023-12-15 NOTE — REVIEW OF SYSTEMS
[Negative] : Heme/Lymph [Fever] : fever [Chills] : chills [Postnasal Drip] : postnasal drip [Nasal Discharge] : nasal discharge [Cough] : cough [Headache] : headache

## 2023-12-17 PROBLEM — U07.1 COVID-19 VIRUS INFECTION: Status: ACTIVE | Noted: 2022-03-21

## 2023-12-17 NOTE — HEALTH RISK ASSESSMENT
[0] : 2) Feeling down, depressed, or hopeless: Not at all (0) [PHQ-2 Negative - No further assessment needed] : PHQ-2 Negative - No further assessment needed [Never] : Never [[id number]] : 0

## 2023-12-17 NOTE — PHYSICAL EXAM
[No Acute Distress] : no acute distress [No Respiratory Distress] : no respiratory distress  [No Accessory Muscle Use] : no accessory muscle use [Speech Grossly Normal] : speech grossly normal [Memory Grossly Normal] : memory grossly normal [Alert and Oriented x3] : oriented to person, place, and time [Well Developed] : well developed [Well-Appearing] : well-appearing

## 2023-12-17 NOTE — HISTORY OF PRESENT ILLNESS
[Home] : at home, [unfilled] , at the time of the visit. [Medical Office: ([facility])___] : at the medical office located in  [Verbal consent obtained from patient] : the patient, [unfilled] [FreeTextEntry1] : COVID [de-identified] : BETTY ABREU is a 63 year old male with PMH of anxiety, IVY on CPAP, asthma, OA, kidney stones, GERD, HTN, HLD, mitral valve prolapse presented to the telehealth today for c/o chills, nasal drip & congestion, low grade fever, very minor dry cough, and headache since Wednesday. He tested positive for COVID x 3. Took paxlovid last year when had covid and tolerated well. Denies chest pain, SOB, SPRING, dizziness, diaphoresis, palpitations, LE swelling, orthopnea, syncope, n/v.

## 2023-12-19 ENCOUNTER — APPOINTMENT (OUTPATIENT)
Dept: OPHTHALMOLOGY | Facility: CLINIC | Age: 63
End: 2023-12-19

## 2023-12-22 ENCOUNTER — TRANSCRIPTION ENCOUNTER (OUTPATIENT)
Age: 63
End: 2023-12-22

## 2023-12-26 LAB
ALBUMIN SERPL ELPH-MCNC: 4.1 G/DL
ALP BLD-CCNC: 92 U/L
ALT SERPL-CCNC: 50 U/L
ANION GAP SERPL CALC-SCNC: 11 MMOL/L
AST SERPL-CCNC: 17 U/L
BILIRUB SERPL-MCNC: 0.3 MG/DL
BUN SERPL-MCNC: 13 MG/DL
CALCIUM SERPL-MCNC: 9.7 MG/DL
CHLORIDE SERPL-SCNC: 104 MMOL/L
CHOLEST SERPL-MCNC: 100 MG/DL
CK SERPL-CCNC: 60 U/L
CO2 SERPL-SCNC: 26 MMOL/L
CREAT SERPL-MCNC: 0.92 MG/DL
EGFR: 95 ML/MIN/1.73M2
ESTIMATED AVERAGE GLUCOSE: 111 MG/DL
GLUCOSE SERPL-MCNC: 92 MG/DL
HBA1C MFR BLD HPLC: 5.5 %
HCT VFR BLD CALC: 39.9 %
HDLC SERPL-MCNC: 37 MG/DL
HGB BLD-MCNC: 13.1 G/DL
LDLC SERPL CALC-MCNC: 46 MG/DL
MCHC RBC-ENTMCNC: 31.4 PG
MCHC RBC-ENTMCNC: 32.8 GM/DL
MCV RBC AUTO: 95.7 FL
NONHDLC SERPL-MCNC: 63 MG/DL
PLATELET # BLD AUTO: 281 K/UL
POTASSIUM SERPL-SCNC: 4.6 MMOL/L
PROT SERPL-MCNC: 6.5 G/DL
RBC # BLD: 4.17 M/UL
RBC # FLD: 13.3 %
SODIUM SERPL-SCNC: 141 MMOL/L
T4 FREE SERPL-MCNC: 1.7 NG/DL
TRIGL SERPL-MCNC: 85 MG/DL
TSH SERPL-ACNC: 0.79 UIU/ML
WBC # FLD AUTO: 7.71 K/UL

## 2024-01-02 ENCOUNTER — APPOINTMENT (OUTPATIENT)
Dept: INTERNAL MEDICINE | Facility: CLINIC | Age: 64
End: 2024-01-02
Payer: COMMERCIAL

## 2024-01-02 ENCOUNTER — LABORATORY RESULT (OUTPATIENT)
Age: 64
End: 2024-01-02

## 2024-01-02 ENCOUNTER — NON-APPOINTMENT (OUTPATIENT)
Age: 64
End: 2024-01-02

## 2024-01-02 VITALS
WEIGHT: 291 LBS | TEMPERATURE: 98.3 F | DIASTOLIC BLOOD PRESSURE: 70 MMHG | BODY MASS INDEX: 37.35 KG/M2 | SYSTOLIC BLOOD PRESSURE: 110 MMHG | HEART RATE: 93 BPM | HEIGHT: 74 IN | OXYGEN SATURATION: 96 %

## 2024-01-02 VITALS — SYSTOLIC BLOOD PRESSURE: 95 MMHG | DIASTOLIC BLOOD PRESSURE: 65 MMHG

## 2024-01-02 DIAGNOSIS — R74.01 ELEVATION OF LEVELS OF LIVER TRANSAMINASE LEVELS: ICD-10-CM

## 2024-01-02 DIAGNOSIS — M54.16 RADICULOPATHY, LUMBAR REGION: ICD-10-CM

## 2024-01-02 LAB
ALBUMIN SERPL ELPH-MCNC: 4.8 G/DL
ALP BLD-CCNC: 108 U/L
ALT SERPL-CCNC: 40 U/L
ANION GAP SERPL CALC-SCNC: 12 MMOL/L
AST SERPL-CCNC: 24 U/L
BASOPHILS # BLD AUTO: 0.04 K/UL
BASOPHILS NFR BLD AUTO: 0.5 %
BILIRUB SERPL-MCNC: 0.5 MG/DL
BUN SERPL-MCNC: 13 MG/DL
CALCIUM SERPL-MCNC: 10.4 MG/DL
CHLORIDE SERPL-SCNC: 101 MMOL/L
CO2 SERPL-SCNC: 26 MMOL/L
CREAT SERPL-MCNC: 0.95 MG/DL
EGFR: 91 ML/MIN/1.73M2
EOSINOPHIL # BLD AUTO: 0.09 K/UL
EOSINOPHIL NFR BLD AUTO: 1 %
FERRITIN SERPL-MCNC: 553 NG/ML
FOLATE SERPL-MCNC: >20 NG/ML
GLUCOSE SERPL-MCNC: 85 MG/DL
HCT VFR BLD CALC: 46.4 %
HGB BLD-MCNC: 15 G/DL
IMM GRANULOCYTES NFR BLD AUTO: 0.3 %
IRON SATN MFR SERPL: 19 %
IRON SERPL-MCNC: 70 UG/DL
LYMPHOCYTES # BLD AUTO: 1.35 K/UL
LYMPHOCYTES NFR BLD AUTO: 15.3 %
MAN DIFF?: NORMAL
MCHC RBC-ENTMCNC: 31.3 PG
MCHC RBC-ENTMCNC: 32.3 GM/DL
MCV RBC AUTO: 96.9 FL
MONOCYTES # BLD AUTO: 0.52 K/UL
MONOCYTES NFR BLD AUTO: 5.9 %
NEUTROPHILS # BLD AUTO: 6.82 K/UL
NEUTROPHILS NFR BLD AUTO: 77 %
PLATELET # BLD AUTO: 204 K/UL
POTASSIUM SERPL-SCNC: 5 MMOL/L
PROT SERPL-MCNC: 8.2 G/DL
RBC # BLD: 4.79 M/UL
RBC # FLD: 13.5 %
SODIUM SERPL-SCNC: 139 MMOL/L
TIBC SERPL-MCNC: 378 UG/DL
UIBC SERPL-MCNC: 308 UG/DL
VIT B12 SERPL-MCNC: 822 PG/ML
WBC # FLD AUTO: 8.85 K/UL

## 2024-01-02 PROCEDURE — G2211 COMPLEX E/M VISIT ADD ON: CPT

## 2024-01-02 PROCEDURE — 93000 ELECTROCARDIOGRAM COMPLETE: CPT

## 2024-01-02 PROCEDURE — 99214 OFFICE O/P EST MOD 30 MIN: CPT | Mod: 25

## 2024-01-02 RX ORDER — AMLODIPINE BESYLATE 5 MG/1
5 TABLET ORAL
Qty: 90 | Refills: 3 | Status: DISCONTINUED | COMMUNITY
Start: 2018-09-28 | End: 2024-01-02

## 2024-01-02 RX ORDER — NIRMATRELVIR AND RITONAVIR 300-100 MG
20 X 150 MG & KIT ORAL
Qty: 1 | Refills: 0 | Status: DISCONTINUED | COMMUNITY
Start: 2023-12-15 | End: 2024-01-02

## 2024-01-02 RX ORDER — SPIRONOLACTONE AND HYDROCHLOROTHIAZIDE 25; 25 MG/1; MG/1
25-25 TABLET, FILM COATED ORAL
Qty: 90 | Refills: 3 | Status: DISCONTINUED | COMMUNITY
Start: 2021-03-01 | End: 2024-01-02

## 2024-01-02 NOTE — HISTORY OF PRESENT ILLNESS
[FreeTextEntry1] : f/u multiple issue [de-identified] :  BETTY ABREU is a 63 year old M with PMHx of anxiety, IVY on CPAP, asthma, OA, kidney stones, HTN, HLD, mitral valve prolapse GERD who presents today for follow up. Pt is currently on Wegovy and has lost 60 pounds within the past two and half months. Pt's recent bloodwork showed elevated ALT. He is scheduled to see cardiologist on Friday. Pt wishes to stop water pill due to experiencing some lightheadedness and dizziness right after standing up and bp running low without any leg swelling. We stopped his amlodipine about 1 weeks ago.  Patient feels well. No complaints. Denies chest pain, sob, gallagher,, orthopnea, diaphoresis, palpitations, LE swelling, orthopnea, syncope, n/v, headache.

## 2024-01-02 NOTE — HEALTH RISK ASSESSMENT
[0] : 2) Feeling down, depressed, or hopeless: Not at all (0) [PHQ-2 Negative - No further assessment needed] : PHQ-2 Negative - No further assessment needed [Never] : Never [[id number]] : 0

## 2024-01-08 ENCOUNTER — TRANSCRIPTION ENCOUNTER (OUTPATIENT)
Age: 64
End: 2024-01-08

## 2024-01-09 ENCOUNTER — APPOINTMENT (OUTPATIENT)
Dept: OPHTHALMOLOGY | Facility: CLINIC | Age: 64
End: 2024-01-09
Payer: COMMERCIAL

## 2024-01-09 ENCOUNTER — NON-APPOINTMENT (OUTPATIENT)
Age: 64
End: 2024-01-09

## 2024-01-09 PROCEDURE — 99204 OFFICE O/P NEW MOD 45 MIN: CPT

## 2024-01-09 PROCEDURE — 92285 EXTERNAL OCULAR PHOTOGRAPHY: CPT

## 2024-01-10 ENCOUNTER — APPOINTMENT (OUTPATIENT)
Dept: INTERNAL MEDICINE | Facility: CLINIC | Age: 64
End: 2024-01-10

## 2024-01-17 ENCOUNTER — APPOINTMENT (OUTPATIENT)
Dept: ENDOCRINOLOGY | Facility: CLINIC | Age: 64
End: 2024-01-17
Payer: COMMERCIAL

## 2024-01-17 VITALS
DIASTOLIC BLOOD PRESSURE: 68 MMHG | BODY MASS INDEX: 37.91 KG/M2 | OXYGEN SATURATION: 98 % | HEART RATE: 86 BPM | SYSTOLIC BLOOD PRESSURE: 124 MMHG | WEIGHT: 295.38 LBS | HEIGHT: 74 IN | TEMPERATURE: 98.5 F

## 2024-01-17 DIAGNOSIS — E66.3 OVERWEIGHT: ICD-10-CM

## 2024-01-17 PROCEDURE — 99214 OFFICE O/P EST MOD 30 MIN: CPT

## 2024-01-17 NOTE — HISTORY OF PRESENT ILLNESS
[FreeTextEntry1] : 63 year M here for assessment of obesity and pre-DM  Currently on wegovy 1.7mg Q weekly   reports plateau in weight  Less exercise given winter    Follows a special diet: Calorie restricted/ Low Cholesterol  Food Cravings: Less on wegovy , reports less ETOH consumption Tried to lose weight before: Yes   Tried any diet plans/ meal replacements for weight control: No Activity level: typically  light activity Ease of skin bruising: No Proximal muscle weakness: No Prior weight loss surgery: No     History of the following:   Thyroid disease: No Heart disease: No Mood disorder: No Hypertension: yes Seizures: No Gall bladder disease: s/p cholecystectomy Known diabetic retinopathy: No  Pancreatitis: No Organ transplant: No

## 2024-01-25 ENCOUNTER — NON-APPOINTMENT (OUTPATIENT)
Age: 64
End: 2024-01-25

## 2024-01-29 LAB
BASOPHILS # BLD AUTO: 0.03 K/UL
BASOPHILS NFR BLD AUTO: 0.5 %
EOSINOPHIL # BLD AUTO: 0.09 K/UL
EOSINOPHIL NFR BLD AUTO: 1.6 %
FERRITIN SERPL-MCNC: 347 NG/ML
HCT VFR BLD CALC: 39 %
HGB BLD-MCNC: 12.9 G/DL
IMM GRANULOCYTES NFR BLD AUTO: 0.2 %
LYMPHOCYTES # BLD AUTO: 1.1 K/UL
LYMPHOCYTES NFR BLD AUTO: 19.6 %
MAN DIFF?: NORMAL
MCHC RBC-ENTMCNC: 31.5 PG
MCHC RBC-ENTMCNC: 33.1 GM/DL
MCV RBC AUTO: 95.4 FL
MONOCYTES # BLD AUTO: 0.4 K/UL
MONOCYTES NFR BLD AUTO: 7.1 %
NEUTROPHILS # BLD AUTO: 3.97 K/UL
NEUTROPHILS NFR BLD AUTO: 71 %
PLATELET # BLD AUTO: 209 K/UL
RBC # BLD: 4.09 M/UL
RBC # FLD: 15 %
WBC # FLD AUTO: 5.6 K/UL

## 2024-02-02 ENCOUNTER — NON-APPOINTMENT (OUTPATIENT)
Age: 64
End: 2024-02-02

## 2024-02-02 ENCOUNTER — APPOINTMENT (OUTPATIENT)
Dept: OPHTHALMOLOGY | Facility: CLINIC | Age: 64
End: 2024-02-02
Payer: COMMERCIAL

## 2024-02-02 PROCEDURE — 99215 OFFICE O/P EST HI 40 MIN: CPT

## 2024-02-02 PROCEDURE — 92083 EXTENDED VISUAL FIELD XM: CPT

## 2024-02-02 PROCEDURE — 92285 EXTERNAL OCULAR PHOTOGRAPHY: CPT

## 2024-02-06 LAB
BASOPHILS # BLD AUTO: 0.03 K/UL
BASOPHILS NFR BLD AUTO: 0.5 %
EOSINOPHIL # BLD AUTO: 0.11 K/UL
EOSINOPHIL NFR BLD AUTO: 1.8 %
HCT VFR BLD CALC: 44.7 %
HGB BLD-MCNC: 14.8 G/DL
IMM GRANULOCYTES NFR BLD AUTO: 0.2 %
LYMPHOCYTES # BLD AUTO: 1.52 K/UL
LYMPHOCYTES NFR BLD AUTO: 24.6 %
MAN DIFF?: NORMAL
MCHC RBC-ENTMCNC: 32 PG
MCHC RBC-ENTMCNC: 33.1 GM/DL
MCV RBC AUTO: 96.5 FL
MONOCYTES # BLD AUTO: 0.44 K/UL
MONOCYTES NFR BLD AUTO: 7.1 %
NEUTROPHILS # BLD AUTO: 4.06 K/UL
NEUTROPHILS NFR BLD AUTO: 65.8 %
PLATELET # BLD AUTO: 215 K/UL
RBC # BLD: 4.63 M/UL
RBC # FLD: 14.6 %
WBC # FLD AUTO: 6.17 K/UL

## 2024-03-08 ENCOUNTER — APPOINTMENT (OUTPATIENT)
Dept: PULMONOLOGY | Facility: CLINIC | Age: 64
End: 2024-03-08
Payer: COMMERCIAL

## 2024-03-08 VITALS
WEIGHT: 261 LBS | DIASTOLIC BLOOD PRESSURE: 69 MMHG | OXYGEN SATURATION: 98 % | HEART RATE: 104 BPM | BODY MASS INDEX: 33.51 KG/M2 | SYSTOLIC BLOOD PRESSURE: 100 MMHG

## 2024-03-08 PROCEDURE — 94727 GAS DIL/WSHOT DETER LNG VOL: CPT

## 2024-03-08 PROCEDURE — 94010 BREATHING CAPACITY TEST: CPT

## 2024-03-08 PROCEDURE — 94729 DIFFUSING CAPACITY: CPT

## 2024-03-08 PROCEDURE — 95012 NITRIC OXIDE EXP GAS DETER: CPT

## 2024-03-08 PROCEDURE — 99214 OFFICE O/P EST MOD 30 MIN: CPT | Mod: 25

## 2024-03-08 RX ORDER — CHOLESTYRAMINE 4 G/9G
POWDER, FOR SUSPENSION ORAL
Refills: 0 | Status: COMPLETED | COMMUNITY
End: 2024-03-08

## 2024-03-08 RX ORDER — DICLOFENAC SODIUM 75 MG/1
75 TABLET, DELAYED RELEASE ORAL
Refills: 0 | Status: COMPLETED | COMMUNITY
Start: 2018-07-30 | End: 2024-03-08

## 2024-03-08 RX ORDER — BUDESONIDE AND FORMOTEROL FUMARATE DIHYDRATE 160; 4.5 UG/1; UG/1
160-4.5 AEROSOL RESPIRATORY (INHALATION) TWICE DAILY
Qty: 30.6 | Refills: 3 | Status: COMPLETED | COMMUNITY
Start: 2022-06-27 | End: 2024-03-08

## 2024-03-08 NOTE — PROCEDURE
[FreeTextEntry1] : [facility] 9/14/22.  Moderate IVY with AHI of 21  03/08/2024 Pulmonary function testing FEV1, FVC, and FEV1/FVC are within normal limits. TLC and subdivisions are normal. RV/TLC ratio is normal. Single breath diffusion capacity is normal.  Increase in function compared to February 2023.

## 2024-03-08 NOTE — HISTORY OF PRESENT ILLNESS
[Never] : never [TextBox_4] : For evaluation f/u asthma  Doing well. Valium only when going for procedures using Advair generic and Singulair, breathing is well; albuterol rarely  Denies significant cough wheezing or chest congestion. has failed cpap in past  Cardio Dr. Fan - stress test and said there was "some scarring noted"; CT angiogram about 2-3 weeks ago was normal   Currently on Wegovy - lost about 87lbs since September; feels that he has had improvement in respiratory status with weight loss.

## 2024-03-08 NOTE — PHYSICAL EXAM
[No Acute Distress] : no acute distress [Normal Oropharynx] : normal oropharynx [Supple] : supple [No JVD] : no jvd [Normal S1, S2] : normal s1, s2 [No Murmurs] : no murmurs [Clear to Auscultation Bilaterally] : clear to auscultation bilaterally [Normal to Percussion] : normal to percussion [No Abnormalities] : no abnormalities [Benign] : benign [Not Tender] : not tender [No HSM] : no hsm [No Clubbing] : no clubbing [No Cyanosis] : no cyanosis [No Focal Deficits] : no focal deficits [Oriented x3] : oriented x3 [Normal Affect] : normal affect [TextBox_2] : Overweight [TextBox_105] : trace edema

## 2024-03-08 NOTE — DISCUSSION/SUMMARY
[FreeTextEntry1] : Areas of atelectasis left lung base appreciated on chest radiograph and CT.  Likely infectious inflammatory in origin.  Areas of atelectasis versus scarring not clearly seen in 2011. Small pulmonary nodule.  Non-smoker. Overweight  Component of anxiety. Asthma.  Clinically improved on bronchodilator therapy. GERD Osteoarthritis Hyperlipidemia IVY moderate

## 2024-03-08 NOTE — ASSESSMENT
[FreeTextEntry1] : Follow chest radiograph. Repeat RTO No indication for f/u CT low risk pt.  cont Symbicort and Singulair cont MAD, will r/t for 2 night HHS off MAD when completes weight loss.  . 35 minutes spent in evaluation management and review of studies.

## 2024-03-13 ENCOUNTER — APPOINTMENT (OUTPATIENT)
Dept: INTERNAL MEDICINE | Facility: CLINIC | Age: 64
End: 2024-03-13
Payer: COMMERCIAL

## 2024-03-13 VITALS
HEIGHT: 74 IN | WEIGHT: 258 LBS | SYSTOLIC BLOOD PRESSURE: 90 MMHG | OXYGEN SATURATION: 96 % | TEMPERATURE: 98.2 F | BODY MASS INDEX: 33.11 KG/M2 | HEART RATE: 86 BPM | DIASTOLIC BLOOD PRESSURE: 60 MMHG

## 2024-03-13 VITALS — SYSTOLIC BLOOD PRESSURE: 90 MMHG | DIASTOLIC BLOOD PRESSURE: 60 MMHG

## 2024-03-13 DIAGNOSIS — R94.39 ABNORMAL RESULT OF OTHER CARDIOVASCULAR FUNCTION STUDY: ICD-10-CM

## 2024-03-13 DIAGNOSIS — R91.1 SOLITARY PULMONARY NODULE: ICD-10-CM

## 2024-03-13 DIAGNOSIS — E78.5 HYPERLIPIDEMIA, UNSPECIFIED: ICD-10-CM

## 2024-03-13 DIAGNOSIS — R73.03 PREDIABETES.: ICD-10-CM

## 2024-03-13 DIAGNOSIS — R79.89 OTHER SPECIFIED ABNORMAL FINDINGS OF BLOOD CHEMISTRY: ICD-10-CM

## 2024-03-13 DIAGNOSIS — I10 ESSENTIAL (PRIMARY) HYPERTENSION: ICD-10-CM

## 2024-03-13 DIAGNOSIS — D69.1 QUALITATIVE PLATELET DEFECTS: ICD-10-CM

## 2024-03-13 DIAGNOSIS — K76.89 OTHER SPECIFIED DISEASES OF LIVER: ICD-10-CM

## 2024-03-13 DIAGNOSIS — J45.909 UNSPECIFIED ASTHMA, UNCOMPLICATED: ICD-10-CM

## 2024-03-13 DIAGNOSIS — R97.20 ELEVATED PROSTATE, SPECIFIC ANTIGEN [PSA]: ICD-10-CM

## 2024-03-13 DIAGNOSIS — G47.33 OBSTRUCTIVE SLEEP APNEA (ADULT) (PEDIATRIC): ICD-10-CM

## 2024-03-13 DIAGNOSIS — J02.9 ACUTE PHARYNGITIS, UNSPECIFIED: ICD-10-CM

## 2024-03-13 DIAGNOSIS — Z12.9 ENCOUNTER FOR SCREENING FOR MALIGNANT NEOPLASM, SITE UNSPECIFIED: ICD-10-CM

## 2024-03-13 LAB
INFLUENZA A RESULT: NOT DETECTED
INFLUENZA B RESULT: NOT DETECTED
RESP SYN VIRUS RESULT: NOT DETECTED
S PYO DNA THROAT QL NAA+PROBE: NOT DETECTED
SARS-COV-2 RESULT: NOT DETECTED

## 2024-03-13 PROCEDURE — G2211 COMPLEX E/M VISIT ADD ON: CPT

## 2024-03-13 PROCEDURE — 99214 OFFICE O/P EST MOD 30 MIN: CPT

## 2024-03-13 RX ORDER — LISINOPRIL 5 MG/1
5 TABLET ORAL
Qty: 90 | Refills: 2 | Status: ACTIVE | COMMUNITY
Start: 2023-01-12 | End: 1900-01-01

## 2024-03-15 LAB — BACTERIA THROAT CULT: NORMAL

## 2024-03-17 PROBLEM — K76.89 OTHER CHRONIC NONALCOHOLIC LIVER DISEASE: Status: ACTIVE | Noted: 2018-07-11

## 2024-03-17 PROBLEM — J02.9 ST (SORE THROAT): Status: ACTIVE | Noted: 2024-03-13 | Resolved: 2024-04-12

## 2024-03-17 PROBLEM — Z12.9 CANCER SCREENING: Status: ACTIVE | Noted: 2023-05-25

## 2024-03-17 PROBLEM — I10 BENIGN HYPERTENSION WITHOUT CONGESTIVE HEART FAILURE: Status: ACTIVE | Noted: 2018-07-11

## 2024-03-17 PROBLEM — J45.909 ASTHMA, UNSPECIFIED ASTHMA SEVERITY, UNSPECIFIED WHETHER COMPLICATED, UNSPECIFIED WHETHER PERSISTENT: Status: ACTIVE | Noted: 2018-07-11

## 2024-03-17 PROBLEM — D69.1 PLATELET DISORDER: Status: ACTIVE | Noted: 2024-01-08

## 2024-03-17 PROBLEM — R94.39 ABNORMAL STRESS TEST: Status: ACTIVE | Noted: 2024-03-13

## 2024-03-17 PROBLEM — R79.89 ELEVATED FERRITIN: Status: ACTIVE | Noted: 2024-01-08

## 2024-03-17 PROBLEM — R97.20 PSA ELEVATION: Status: ACTIVE | Noted: 2024-01-02

## 2024-03-17 PROBLEM — R73.03 PREDIABETES: Status: ACTIVE | Noted: 2023-11-27

## 2024-03-17 PROBLEM — G47.33 OBSTRUCTIVE SLEEP APNEA: Status: ACTIVE | Noted: 2018-07-11

## 2024-03-17 PROBLEM — E78.5 BORDERLINE HYPERLIPIDEMIA: Status: ACTIVE | Noted: 2020-08-25

## 2024-03-17 NOTE — HEALTH RISK ASSESSMENT
[0] : 2) Feeling down, depressed, or hopeless: Not at all (0) [PHQ-2 Negative - No further assessment needed] : PHQ-2 Negative - No further assessment needed [Never] : Never [[id number]] : 0

## 2024-03-17 NOTE — HISTORY OF PRESENT ILLNESS
[FreeTextEntry1] : 2 month f/u [de-identified] : BETTY ABREU is a 63 year old M with PMHx of anxiety, IVY on CPAP, asthma, OA, kidney stones, HTN, HLD, mitral valve prolapse GERD who presents today for 2 month follow up.  He c/o sore throat since this morning. Denies f/c. Reports his daughter and grandson have been sick with strep throat at home.  Reports he intentionally lost 90lbs in the span of 5.5 months on wegovy and feels great. Denies chest pain, SOB, SPRING, dizziness, diaphoresis, palpitations, LE swelling, orthopnea, syncope, n/v, headache.

## 2024-03-18 ENCOUNTER — NON-APPOINTMENT (OUTPATIENT)
Age: 64
End: 2024-03-18

## 2024-03-26 LAB
ALBUMIN SERPL ELPH-MCNC: 4.3 G/DL
ALP BLD-CCNC: 79 U/L
ALT SERPL-CCNC: 33 U/L
ANION GAP SERPL CALC-SCNC: 8 MMOL/L
AST SERPL-CCNC: 22 U/L
BASOPHILS # BLD AUTO: 0.06 K/UL
BASOPHILS NFR BLD AUTO: 0.6 %
BILIRUB SERPL-MCNC: 0.4 MG/DL
BUN SERPL-MCNC: 12 MG/DL
CALCIUM SERPL-MCNC: 9.7 MG/DL
CHLORIDE SERPL-SCNC: 102 MMOL/L
CHOLEST SERPL-MCNC: 110 MG/DL
CK SERPL-CCNC: 42 U/L
CO2 SERPL-SCNC: 31 MMOL/L
CREAT SERPL-MCNC: 0.81 MG/DL
EGFR: 100 ML/MIN/1.73M2
EOSINOPHIL # BLD AUTO: 0.18 K/UL
EOSINOPHIL NFR BLD AUTO: 1.7 %
ESTIMATED AVERAGE GLUCOSE: 97 MG/DL
GLUCOSE SERPL-MCNC: 92 MG/DL
HBA1C MFR BLD HPLC: 5 %
HCT VFR BLD CALC: 42.7 %
HDLC SERPL-MCNC: 52 MG/DL
HGB BLD-MCNC: 14.2 G/DL
IMM GRANULOCYTES NFR BLD AUTO: 0.4 %
LDLC SERPL CALC-MCNC: 47 MG/DL
LYMPHOCYTES # BLD AUTO: 1.42 K/UL
LYMPHOCYTES NFR BLD AUTO: 13.2 %
MAN DIFF?: NORMAL
MCHC RBC-ENTMCNC: 31.7 PG
MCHC RBC-ENTMCNC: 33.3 GM/DL
MCV RBC AUTO: 95.3 FL
MONOCYTES # BLD AUTO: 0.6 K/UL
MONOCYTES NFR BLD AUTO: 5.6 %
NEUTROPHILS # BLD AUTO: 8.44 K/UL
NEUTROPHILS NFR BLD AUTO: 78.5 %
NONHDLC SERPL-MCNC: 59 MG/DL
PLATELET # BLD AUTO: 177 K/UL
POTASSIUM SERPL-SCNC: 4.3 MMOL/L
PROT SERPL-MCNC: 6.5 G/DL
RBC # BLD: 4.48 M/UL
RBC # FLD: 14.6 %
SODIUM SERPL-SCNC: 142 MMOL/L
T4 FREE SERPL-MCNC: 1.7 NG/DL
TRIGL SERPL-MCNC: 47 MG/DL
TSH SERPL-ACNC: 0.93 UIU/ML
WBC # FLD AUTO: 10.74 K/UL

## 2024-04-09 ENCOUNTER — TRANSCRIPTION ENCOUNTER (OUTPATIENT)
Age: 64
End: 2024-04-09

## 2024-04-10 ENCOUNTER — APPOINTMENT (OUTPATIENT)
Dept: OPHTHALMOLOGY | Facility: HOSPITAL | Age: 64
End: 2024-04-10

## 2024-04-10 ENCOUNTER — OUTPATIENT (OUTPATIENT)
Dept: OUTPATIENT SERVICES | Facility: HOSPITAL | Age: 64
LOS: 1 days | End: 2024-04-10
Payer: COMMERCIAL

## 2024-04-10 ENCOUNTER — TRANSCRIPTION ENCOUNTER (OUTPATIENT)
Age: 64
End: 2024-04-10

## 2024-04-10 VITALS
TEMPERATURE: 98 F | SYSTOLIC BLOOD PRESSURE: 135 MMHG | HEART RATE: 87 BPM | OXYGEN SATURATION: 96 % | RESPIRATION RATE: 12 BRPM | HEIGHT: 73 IN | WEIGHT: 259.7 LBS | DIASTOLIC BLOOD PRESSURE: 83 MMHG

## 2024-04-10 VITALS
HEART RATE: 82 BPM | SYSTOLIC BLOOD PRESSURE: 139 MMHG | RESPIRATION RATE: 17 BRPM | OXYGEN SATURATION: 95 % | DIASTOLIC BLOOD PRESSURE: 82 MMHG

## 2024-04-10 DIAGNOSIS — H02.403 UNSPECIFIED PTOSIS OF BILATERAL EYELIDS: ICD-10-CM

## 2024-04-10 DIAGNOSIS — H57.813 BROW PTOSIS, BILATERAL: ICD-10-CM

## 2024-04-10 DIAGNOSIS — H02.535 EYELID RETRACTION LEFT LOWER EYELID: ICD-10-CM

## 2024-04-10 DIAGNOSIS — Z90.49 ACQUIRED ABSENCE OF OTHER SPECIFIED PARTS OF DIGESTIVE TRACT: Chronic | ICD-10-CM

## 2024-04-10 DIAGNOSIS — Z98.890 OTHER SPECIFIED POSTPROCEDURAL STATES: Chronic | ICD-10-CM

## 2024-04-10 DIAGNOSIS — H02.532 EYELID RETRACTION RIGHT LOWER EYELID: ICD-10-CM

## 2024-04-10 DIAGNOSIS — Z96.659 PRESENCE OF UNSPECIFIED ARTIFICIAL KNEE JOINT: Chronic | ICD-10-CM

## 2024-04-10 PROCEDURE — 67900 REPAIR BROW DEFECT: CPT | Mod: 50

## 2024-04-10 PROCEDURE — 67911 REVISE EYELID DEFECT: CPT | Mod: E2,E4

## 2024-04-10 PROCEDURE — 67950 REVISION OF EYELID: CPT | Mod: 50

## 2024-04-10 PROCEDURE — 15777 ACELLULAR DERM MATRIX IMPLT: CPT

## 2024-04-10 PROCEDURE — 99261: CPT

## 2024-04-10 PROCEDURE — 67904 REPAIR EYELID DEFECT: CPT | Mod: 50

## 2024-04-10 PROCEDURE — 67911 REVISE EYELID DEFECT: CPT | Mod: 50

## 2024-04-10 RX ORDER — LOVASTATIN 20 MG
1 TABLET ORAL
Qty: 0 | Refills: 0 | DISCHARGE

## 2024-04-10 RX ORDER — MONTELUKAST 4 MG/1
1 TABLET, CHEWABLE ORAL
Refills: 0 | DISCHARGE

## 2024-04-10 RX ORDER — ACETAMINOPHEN 500 MG
2 TABLET ORAL
Qty: 0 | Refills: 0 | DISCHARGE

## 2024-04-10 RX ORDER — CHOLECALCIFEROL (VITAMIN D3) 125 MCG
1 CAPSULE ORAL
Qty: 0 | Refills: 0 | DISCHARGE

## 2024-04-10 RX ORDER — SPIRONOLACTONE 25 MG/1
1 TABLET, FILM COATED ORAL
Qty: 0 | Refills: 0 | DISCHARGE

## 2024-04-10 RX ORDER — DICLOFENAC SODIUM 75 MG/1
1 TABLET, DELAYED RELEASE ORAL
Qty: 0 | Refills: 0 | DISCHARGE

## 2024-04-10 RX ORDER — QUINAPRIL HYDROCHLORIDE 40 MG/1
1 TABLET, FILM COATED ORAL
Qty: 0 | Refills: 0 | DISCHARGE

## 2024-04-10 RX ORDER — ALLOPURINOL 300 MG
1 TABLET ORAL
Qty: 0 | Refills: 0 | DISCHARGE

## 2024-04-10 RX ORDER — LISINOPRIL 2.5 MG/1
0.5 TABLET ORAL
Refills: 0 | DISCHARGE

## 2024-04-10 RX ORDER — MULTIVIT-MIN/FERROUS GLUCONATE 9 MG/15 ML
1 LIQUID (ML) ORAL
Qty: 0 | Refills: 0 | DISCHARGE

## 2024-04-10 RX ORDER — CHOLESTYRAMINE 4 G/9G
0 POWDER, FOR SUSPENSION ORAL
Qty: 0 | Refills: 0 | DISCHARGE

## 2024-04-10 RX ORDER — SEMAGLUTIDE 0.68 MG/ML
2.4 INJECTION, SOLUTION SUBCUTANEOUS
Refills: 0 | DISCHARGE

## 2024-04-10 RX ORDER — AMLODIPINE BESYLATE 2.5 MG/1
1 TABLET ORAL
Qty: 0 | Refills: 0 | DISCHARGE

## 2024-04-10 RX ORDER — ROSUVASTATIN CALCIUM 5 MG/1
1 TABLET ORAL
Refills: 0 | DISCHARGE

## 2024-04-10 RX ORDER — FAMOTIDINE 10 MG/ML
1 INJECTION INTRAVENOUS
Qty: 0 | Refills: 0 | DISCHARGE

## 2024-04-10 NOTE — ASU PATIENT PROFILE, ADULT - FALL HARM RISK - HARM RISK INTERVENTIONS

## 2024-04-10 NOTE — BRIEF OPERATIVE NOTE - NSICDXBRIEFPREOP_GEN_ALL_CORE_FT
PRE-OP DIAGNOSIS:  Involutional ptosis, acquired, bilateral 10-Apr-2024 12:42:46  Windy Ventura  Brow ptosis, bilateral 10-Apr-2024 12:42:56  Windy Ventura  Lid retraction 10-Apr-2024 12:43:04 bilateral Windy Ventura

## 2024-04-10 NOTE — BRIEF OPERATIVE NOTE - NSICDXBRIEFPROCEDURE_GEN_ALL_CORE_FT
PROCEDURES:  Repair blepharoptosis of both eyes with bilateral external levator advancement 10-Apr-2024 12:44:11  Windy Ventura  Repair of brow ptosis 10-Apr-2024 12:44:46  Windy Ventura  Canthoplasty 10-Apr-2024 12:45:15  Windy Ventura

## 2024-04-10 NOTE — ASU PATIENT PROFILE, ADULT - NSICDXPASTMEDICALHX_GEN_ALL_CORE_FT
PAST MEDICAL HISTORY:  Asthma Controlled    GERD (gastroesophageal reflux disease)     HLD (hyperlipidemia)     Hypertension     IBS (Irritable Bowel Syndrome)     Kidney stones     Lyme disease 2015    MVP (mitral valve prolapse)     Obesity, Morbid     Sleep Apnea Mild -no cpap mask

## 2024-04-10 NOTE — ASU DISCHARGE PLAN (ADULT/PEDIATRIC) - ASU DC SPECIAL INSTRUCTIONSFT
Postop Instructions for Eyelid Surgery – Dr. Mendoza     After surgery instructions     For the remainder of the surgery day and the day after surgery, apply ice to the eyes for at least 20 minutes out of every hour to reduce bruising. Any kind of cold pack or cold compress is fine (for example: ice cubes in a baggie, towel dipped in ice water, frozen gel pack). The more ice the better—you can’t overdo it!     Apply the prescribed ointment to your stitches 4 times a day (breakfast, lunch, dinner, and before bed). If you have been given a prescription for eye drops use them as directed. Continue using these medications until your follow up appointment.     If you have been given a prescription for oral steroids or antibiotics, take these as directed until all the pills are gone.     Take acetaminophen (Tylenol) as needed for pain. Follow the directions on the bottle.      Take all your regular medications and eye drops as usual.     If you were advised to stop a blood thinner prior to surgery, start it again the evening after surgery unless other special directions are given.     It may help minimize swelling to sleep with your head slightly elevated on a pillow     You may shower the day after surgery. It is okay to get your stitches wet and soapy. Do not rub the stitches. Simply let soap and water run over the area and pat it gently dry. If there is any crustiness caught in the stitches you can remove it gently with a damp Q-tip.     On the second day after surgery, you can stop using ice and start using warm compresses if desired to reduce swelling     You may take a gentle walk, climb stairs, and do light household chores as normal     Avoid exercise, lifting heavy weights (>10 pounds), straining to defecate, or other strenuous activities that raise your heart rate or blood pressure for 7 days after surgery.      Avoid doing any dirty, triny activities or chores for 7 days after surgery.     Do not submerge your stitches in any lake, ocean, pool, or hot tub (or any other shared water) for 14 days after surgery.     Do not use any eye makeup or mascara until you have your postop appointment.     Keep your incisions out of the sun for at least 6 months.           What is normal after surgery:     It is normal to have a little bit of blood oozing around the stitches. You can simply wipe it away with a clean tissue or towel.     It is normal for your vision to be slightly blurry. This is usually due to ointment getting in the eye.     It is normal to have pink or bloody tears     It is normal to have significant bruising and swelling around the eyes, even to the point of having two black eyes. It is normal for the bruising to move downwards to your lower eyelids and cheeks with gravity, even if you did not have surgery on the lower eyelids. The bruising and the majority of swelling usually resolves in about 2 weeks. However, residual swelling can linger for weeks to months.     Your eyelids or eyelashes may feel numb for several weeks after surgery.     It is normal for the eyes to feel especially dry, scratchy, teary, or itchy in the first few weeks after surgery. You may use artificial tear drops (over the counter) as needed to soothe the eyes.     After lower eyelid surgery, you may have some mild double vision due to swelling around the muscles. This will normally improve as the swelling goes down.          When to call the doctor:     There is a sudden increase in bruising and swelling or the eyelid is so swollen and hard that you cannot pry the eye open with your hands—THIS IS AN EMERGENCY     If there is severe vision loss and all you can see is shapes or black—THIS IS AN EMERGENCY     There is bleeding from the stitches that does not stop after holding firm pressure with a clean towel for 10 minutes without peeking     You have severe pain that is not relieved by ice and 2 acetaminophen tablets     You have severe pain in the eye and it feels like a stitch is constantly poking you in the eye.          Office phone:      [facility] [phone]     [place]: [phone]     Our office phones are answered 24 hours a day. After business hours, please identify yourself as a patient who just had surgery and ask to speak to the doctor on call.      Dr. Mendoza’s work cell phone: [phone] Emergencies only, please

## 2024-04-10 NOTE — ASU PATIENT PROFILE, ADULT - NSICDXPASTSURGICALHX_GEN_ALL_CORE_FT
PAST SURGICAL HISTORY:  H/O umbilical hernia repair 8/2021    History of cardiac cath     History of cholecystectomy     S/P Colonoscopy S/P Sigmoidoscopy - multiple    S/P knee replacement left knee    Sinus Polyp & cysts removed- 2004    Vasectomy Status 1993

## 2024-04-10 NOTE — ASU DISCHARGE PLAN (ADULT/PEDIATRIC) - CARE PROVIDER_API CALL
Denise Mendoza  Ophthalmology  [address], [address]  [place] [address]  Phone: [phone]  Fax: [phone]  Scheduled Appointment: 04/19/2024 10:15 AM

## 2024-04-10 NOTE — ASU DISCHARGE PLAN (ADULT/PEDIATRIC) - PROCEDURE
bilateral upper lid ptosis repair, bilateral brow pexy, bilateral canthopexy bilateral upper lid ptosis repair, bilateral brow pexy, bilateral canthopexy, bilateral lower lid retraction repair with alloderm graft

## 2024-04-10 NOTE — BRIEF OPERATIVE NOTE - NSICDXBRIEFPOSTOP_GEN_ALL_CORE_FT
POST-OP DIAGNOSIS:  Involutional ptosis, acquired, bilateral 10-Apr-2024 12:41:53  Windy Ventura  Brow ptosis 10-Apr-2024 12:42:00 bilateral Windy Ventura  Lid retraction 10-Apr-2024 12:42:18 bilateral Windy Ventura

## 2024-04-12 ENCOUNTER — APPOINTMENT (OUTPATIENT)
Dept: OPHTHALMOLOGY | Facility: CLINIC | Age: 64
End: 2024-04-12
Payer: COMMERCIAL

## 2024-04-12 ENCOUNTER — NON-APPOINTMENT (OUTPATIENT)
Age: 64
End: 2024-04-12

## 2024-04-12 PROCEDURE — 99024 POSTOP FOLLOW-UP VISIT: CPT

## 2024-04-18 ENCOUNTER — APPOINTMENT (OUTPATIENT)
Dept: OPHTHALMOLOGY | Facility: CLINIC | Age: 64
End: 2024-04-18
Payer: COMMERCIAL

## 2024-04-18 ENCOUNTER — APPOINTMENT (OUTPATIENT)
Dept: ENDOCRINOLOGY | Facility: CLINIC | Age: 64
End: 2024-04-18
Payer: COMMERCIAL

## 2024-04-18 ENCOUNTER — NON-APPOINTMENT (OUTPATIENT)
Age: 64
End: 2024-04-18

## 2024-04-18 VITALS
WEIGHT: 257.13 LBS | SYSTOLIC BLOOD PRESSURE: 120 MMHG | OXYGEN SATURATION: 97 % | TEMPERATURE: 98.2 F | HEART RATE: 87 BPM | HEIGHT: 74 IN | BODY MASS INDEX: 33 KG/M2 | DIASTOLIC BLOOD PRESSURE: 72 MMHG

## 2024-04-18 DIAGNOSIS — E66.01 MORBID (SEVERE) OBESITY DUE TO EXCESS CALORIES: ICD-10-CM

## 2024-04-18 PROCEDURE — 99024 POSTOP FOLLOW-UP VISIT: CPT

## 2024-04-18 PROCEDURE — 99213 OFFICE O/P EST LOW 20 MIN: CPT

## 2024-04-18 PROCEDURE — 92285 EXTERNAL OCULAR PHOTOGRAPHY: CPT

## 2024-04-18 RX ORDER — SEMAGLUTIDE 2.4 MG/.75ML
2.4 INJECTION, SOLUTION SUBCUTANEOUS
Qty: 3 | Refills: 1 | Status: ACTIVE | COMMUNITY
Start: 2023-09-19 | End: 1900-01-01

## 2024-04-18 NOTE — HISTORY OF PRESENT ILLNESS
[FreeTextEntry1] : 64 year M here for f/up of obesity and pre-DM  Currently on wegovy 2.4mg Q weekly   Less exercise given knee issues   Follows a special diet: Calorie restricted/ Low Cholesterol  Food Cravings: Less on wegovy , reports less ETOH consumption Tried to lose weight before: Yes   Tried any diet plans/ meal replacements for weight control: No Activity level: typically  light activity Ease of skin bruising: No Proximal muscle weakness: No Prior weight loss surgery: No     History of the following:   Thyroid disease: No Heart disease: No Mood disorder: No Hypertension: yes Seizures: No Gall bladder disease: s/p cholecystectomy Known diabetic retinopathy: No  Pancreatitis: No Organ transplant: No

## 2024-04-23 ENCOUNTER — RX RENEWAL (OUTPATIENT)
Age: 64
End: 2024-04-23

## 2024-04-23 RX ORDER — FLUTICASONE PROPIONATE AND SALMETEROL 250; 50 UG/1; UG/1
250-50 POWDER RESPIRATORY (INHALATION)
Qty: 180 | Refills: 1 | Status: ACTIVE | COMMUNITY
Start: 2024-04-23 | End: 1900-01-01

## 2024-05-03 ENCOUNTER — RX RENEWAL (OUTPATIENT)
Age: 64
End: 2024-05-03

## 2024-05-03 RX ORDER — MONTELUKAST 10 MG/1
10 TABLET, FILM COATED ORAL
Qty: 90 | Refills: 3 | Status: ACTIVE | COMMUNITY
Start: 2022-06-27 | End: 1900-01-01

## 2024-05-06 ENCOUNTER — TRANSCRIPTION ENCOUNTER (OUTPATIENT)
Age: 64
End: 2024-05-06

## 2024-05-15 ENCOUNTER — NON-APPOINTMENT (OUTPATIENT)
Age: 64
End: 2024-05-15

## 2024-06-04 ENCOUNTER — APPOINTMENT (OUTPATIENT)
Dept: OPHTHALMOLOGY | Facility: CLINIC | Age: 64
End: 2024-06-04

## 2024-06-04 PROCEDURE — 92285 EXTERNAL OCULAR PHOTOGRAPHY: CPT

## 2024-06-04 PROCEDURE — 99024 POSTOP FOLLOW-UP VISIT: CPT

## 2024-06-11 ENCOUNTER — OFFICE (OUTPATIENT)
Dept: URBAN - METROPOLITAN AREA CLINIC 27 | Facility: CLINIC | Age: 64
Setting detail: OPHTHALMOLOGY
End: 2024-06-11
Payer: COMMERCIAL

## 2024-06-11 DIAGNOSIS — H52.4: ICD-10-CM

## 2024-06-11 DIAGNOSIS — H02.132: ICD-10-CM

## 2024-06-11 DIAGNOSIS — H25.13: ICD-10-CM

## 2024-06-11 DIAGNOSIS — H53.2: ICD-10-CM

## 2024-06-11 DIAGNOSIS — H35.413: ICD-10-CM

## 2024-06-11 DIAGNOSIS — Q14.1: ICD-10-CM

## 2024-06-11 DIAGNOSIS — H02.831: ICD-10-CM

## 2024-06-11 DIAGNOSIS — H10.45: ICD-10-CM

## 2024-06-11 PROCEDURE — 92015 DETERMINE REFRACTIVE STATE: CPT | Performed by: OPHTHALMOLOGY

## 2024-06-11 PROCEDURE — 92201 OPSCPY EXTND RTA DRAW UNI/BI: CPT | Performed by: OPHTHALMOLOGY

## 2024-06-11 PROCEDURE — 92014 COMPRE OPH EXAM EST PT 1/>: CPT | Performed by: OPHTHALMOLOGY

## 2024-06-11 ASSESSMENT — LID POSITION - ECTROPION
OD_ECTROPION: RLL 1+
OS_ECTROPION: LLL 1+

## 2024-06-11 ASSESSMENT — LID POSITION - DERMATOCHALASIS
OS_DERMATOCHALASIS: LUL 2+
OD_DERMATOCHALASIS: RUL 2+

## 2024-06-11 ASSESSMENT — CONFRONTATIONAL VISUAL FIELD TEST (CVF)
OS_FINDINGS: FULL
OD_FINDINGS: FULL

## 2024-06-11 ASSESSMENT — LID POSITION - COMMENTS
OS_COMMENTS: SCLERAL SHOW
OD_COMMENTS: SCLERAL SHOW

## 2024-06-13 ENCOUNTER — RX ONLY (RX ONLY)
Age: 64
End: 2024-06-13

## 2024-06-13 ENCOUNTER — OFFICE (OUTPATIENT)
Facility: LOCATION | Age: 64
Setting detail: OPHTHALMOLOGY
End: 2024-06-13
Payer: COMMERCIAL

## 2024-06-13 DIAGNOSIS — H53.2: ICD-10-CM

## 2024-06-13 DIAGNOSIS — H25.13: ICD-10-CM

## 2024-06-13 DIAGNOSIS — H52.4: ICD-10-CM

## 2024-06-13 DIAGNOSIS — H53.40: ICD-10-CM

## 2024-06-13 PROBLEM — Q14.1 CHRPE LESION ; RIGHT EYE: Status: ACTIVE | Noted: 2024-06-11

## 2024-06-13 PROBLEM — H10.45 ALLERGIC CONJUNCTIVITIS ; BOTH EYES: Status: ACTIVE | Noted: 2024-06-11

## 2024-06-13 PROBLEM — H52.7 REFRACTIVE ERROR ; BOTH EYES: Status: ACTIVE | Noted: 2024-06-11

## 2024-06-13 PROBLEM — H35.413 LATTICE DEGENERATION; BOTH EYES: Status: ACTIVE | Noted: 2024-06-11

## 2024-06-13 PROCEDURE — 92133 CPTRZD OPH DX IMG PST SGM ON: CPT | Performed by: OPHTHALMOLOGY

## 2024-06-13 PROCEDURE — 92083 EXTENDED VISUAL FIELD XM: CPT | Performed by: OPHTHALMOLOGY

## 2024-06-13 PROCEDURE — 92015 DETERMINE REFRACTIVE STATE: CPT | Performed by: OPHTHALMOLOGY

## 2024-06-13 PROCEDURE — 99213 OFFICE O/P EST LOW 20 MIN: CPT | Performed by: OPHTHALMOLOGY

## 2024-06-13 ASSESSMENT — LID POSITION - ECTROPION
OS_ECTROPION: LLL 1+
OD_ECTROPION: RLL 1+

## 2024-06-13 ASSESSMENT — LID POSITION - COMMENTS
OS_COMMENTS: SCLERAL SHOW
OD_COMMENTS: SCLERAL SHOW

## 2024-06-13 ASSESSMENT — LID POSITION - DERMATOCHALASIS
OS_DERMATOCHALASIS: LUL 2+
OD_DERMATOCHALASIS: RUL 2+

## 2024-06-13 ASSESSMENT — CONFRONTATIONAL VISUAL FIELD TEST (CVF)
OD_FINDINGS: FULL
OS_FINDINGS: FULL

## 2024-07-17 ENCOUNTER — APPOINTMENT (OUTPATIENT)
Dept: ENDOCRINOLOGY | Facility: CLINIC | Age: 64
End: 2024-07-17

## 2024-09-09 ENCOUNTER — NON-APPOINTMENT (OUTPATIENT)
Age: 64
End: 2024-09-09

## 2024-09-09 ENCOUNTER — APPOINTMENT (OUTPATIENT)
Dept: PULMONOLOGY | Facility: CLINIC | Age: 64
End: 2024-09-09
Payer: COMMERCIAL

## 2024-09-09 ENCOUNTER — APPOINTMENT (OUTPATIENT)
Dept: INTERNAL MEDICINE | Facility: CLINIC | Age: 64
End: 2024-09-09
Payer: COMMERCIAL

## 2024-09-09 VITALS — SYSTOLIC BLOOD PRESSURE: 130 MMHG | HEART RATE: 85 BPM | OXYGEN SATURATION: 95 % | DIASTOLIC BLOOD PRESSURE: 81 MMHG

## 2024-09-09 VITALS
DIASTOLIC BLOOD PRESSURE: 76 MMHG | HEART RATE: 81 BPM | BODY MASS INDEX: 40.43 KG/M2 | SYSTOLIC BLOOD PRESSURE: 118 MMHG | OXYGEN SATURATION: 97 % | HEIGHT: 74 IN | WEIGHT: 315 LBS | TEMPERATURE: 98.2 F

## 2024-09-09 VITALS — WEIGHT: 279 LBS | BODY MASS INDEX: 35.82 KG/M2

## 2024-09-09 DIAGNOSIS — R91.1 SOLITARY PULMONARY NODULE: ICD-10-CM

## 2024-09-09 DIAGNOSIS — J98.11 ATELECTASIS: ICD-10-CM

## 2024-09-09 DIAGNOSIS — G47.33 OBSTRUCTIVE SLEEP APNEA (ADULT) (PEDIATRIC): ICD-10-CM

## 2024-09-09 DIAGNOSIS — D69.1 QUALITATIVE PLATELET DEFECTS: ICD-10-CM

## 2024-09-09 DIAGNOSIS — K76.89 OTHER SPECIFIED DISEASES OF LIVER: ICD-10-CM

## 2024-09-09 DIAGNOSIS — E66.01 MORBID (SEVERE) OBESITY DUE TO EXCESS CALORIES: ICD-10-CM

## 2024-09-09 DIAGNOSIS — J45.909 UNSPECIFIED ASTHMA, UNCOMPLICATED: ICD-10-CM

## 2024-09-09 DIAGNOSIS — R93.89 ABNORMAL FINDINGS ON DIAGNOSTIC IMAGING OF OTHER SPECIFIED BODY STRUCTURES: ICD-10-CM

## 2024-09-09 DIAGNOSIS — R97.20 ELEVATED PROSTATE, SPECIFIC ANTIGEN [PSA]: ICD-10-CM

## 2024-09-09 DIAGNOSIS — J45.30 MILD PERSISTENT ASTHMA, UNCOMPLICATED: ICD-10-CM

## 2024-09-09 DIAGNOSIS — R94.39 ABNORMAL RESULT OF OTHER CARDIOVASCULAR FUNCTION STUDY: ICD-10-CM

## 2024-09-09 DIAGNOSIS — M54.16 RADICULOPATHY, LUMBAR REGION: ICD-10-CM

## 2024-09-09 DIAGNOSIS — I10 ESSENTIAL (PRIMARY) HYPERTENSION: ICD-10-CM

## 2024-09-09 DIAGNOSIS — R79.89 OTHER SPECIFIED ABNORMAL FINDINGS OF BLOOD CHEMISTRY: ICD-10-CM

## 2024-09-09 PROCEDURE — 95012 NITRIC OXIDE EXP GAS DETER: CPT

## 2024-09-09 PROCEDURE — 94010 BREATHING CAPACITY TEST: CPT

## 2024-09-09 PROCEDURE — 99204 OFFICE O/P NEW MOD 45 MIN: CPT

## 2024-09-09 PROCEDURE — 71046 X-RAY EXAM CHEST 2 VIEWS: CPT

## 2024-09-09 PROCEDURE — 93000 ELECTROCARDIOGRAM COMPLETE: CPT

## 2024-09-09 PROCEDURE — G2211 COMPLEX E/M VISIT ADD ON: CPT | Mod: NC

## 2024-09-09 PROCEDURE — 99213 OFFICE O/P EST LOW 20 MIN: CPT | Mod: 25

## 2024-09-09 PROCEDURE — 99214 OFFICE O/P EST MOD 30 MIN: CPT

## 2024-09-09 NOTE — PROCEDURE
[FreeTextEntry1] : HSS 9/14/22.  Moderate IVY with AHI of 21  09/09/2024 Pulmonary function testing FEV1, FVC, and FEV1/FVC are within normal limits.

## 2024-09-09 NOTE — HISTORY OF PRESENT ILLNESS
[Never] : never [TextBox_4] : lost 90 lbs on wegovi, had stopped and gained 30 lbs now on zepbound no longer suing dental device having issues with back pain.  Likely needs surgery. Patient has been using (wixella) and Singular, respiratory status is stable with rare beta agonist use. here for repeat CXR Denies significant cough wheezing chest discomfort or shortness of breath.  No exacerbations. Valium only when going for procedures, claustrophobia     Cardio Dr. Fan - stress test and said there was "some scarring noted"; CT angiogram about 2-3 weeks ago was normal   Currently on Wegovy - lost about 87lbs since September; feels that he has had improvement in respiratory status with weight loss.

## 2024-09-09 NOTE — PHYSICAL EXAM
[No Acute Distress] : no acute distress [Well Nourished] : well nourished [Well Developed] : well developed [Well-Appearing] : well-appearing [Normal Voice/Communication] : normal voice/communication [Normal Sclera/Conjunctiva] : normal sclera/conjunctiva [PERRL] : pupils equal round and reactive to light [EOMI] : extraocular movements intact [Normal Outer Ear/Nose] : the outer ears and nose were normal in appearance [Normal Oropharynx] : the oropharynx was normal [Normal TMs] : both tympanic membranes were normal [No JVD] : no jugular venous distention [No Lymphadenopathy] : no lymphadenopathy [Supple] : supple [Thyroid Normal, No Nodules] : the thyroid was normal and there were no nodules present [No Respiratory Distress] : no respiratory distress  [No Accessory Muscle Use] : no accessory muscle use [Clear to Auscultation] : lungs were clear to auscultation bilaterally [Normal Rate] : normal rate  [Regular Rhythm] : with a regular rhythm [Normal S1, S2] : normal S1 and S2 [No Murmur] : no murmur heard [No Carotid Bruits] : no carotid bruits [No Varicosities] : no varicosities [Pedal Pulses Present] : the pedal pulses are present [No Edema] : there was no peripheral edema [No Extremity Clubbing/Cyanosis] : no extremity clubbing/cyanosis [Soft] : abdomen soft [Non Tender] : non-tender [Non-distended] : non-distended [Normal Bowel Sounds] : normal bowel sounds [Normal Supraclavicular Nodes] : no supraclavicular lymphadenopathy [Normal Posterior Cervical Nodes] : no posterior cervical lymphadenopathy [Normal Anterior Cervical Nodes] : no anterior cervical lymphadenopathy [No CVA Tenderness] : no CVA  tenderness [No Spinal Tenderness] : no spinal tenderness [No Joint Swelling] : no joint swelling [Grossly Normal Strength/Tone] : grossly normal strength/tone [No Rash] : no rash [Coordination Grossly Intact] : coordination grossly intact [No Focal Deficits] : no focal deficits [Normal Gait] : normal gait [Deep Tendon Reflexes (DTR)] : deep tendon reflexes were 2+ and symmetric [Speech Grossly Normal] : speech grossly normal [Normal Affect] : the affect was normal [Alert and Oriented x3] : oriented to person, place, and time [Normal Insight/Judgement] : insight and judgment were intact

## 2024-09-09 NOTE — ASSESSMENT
[FreeTextEntry1] : No indication for f/u CT low risk pt.  cont Symbicort and Singular F/U 6 months will r/t after weight loss and repeat 2-night hss . 35 minutes spent in evaluation management and review of studies.

## 2024-09-09 NOTE — DISCUSSION/SUMMARY
[FreeTextEntry1] : Areas of atelectasis left lung base appreciated on chest radiograph and CT.  Likely infectious inflammatory in origin.  Areas of atelectasis versus scarring not clearly seen in 2011.  Appears decreased on most recent chest radiograph. Small pulmonary nodule.  Non-smoker. Overweight  Component of anxiety. Asthma.  Clinically improved on bronchodilator therapy. GERD Osteoarthritis Hyperlipidemia IVY moderate

## 2024-09-10 NOTE — HEALTH RISK ASSESSMENT
[0] : 2) Feeling down, depressed, or hopeless: Not at all (0) [PHQ-2 Negative - No further assessment needed] : PHQ-2 Negative - No further assessment needed [Never] : Never [[id number]] : 0

## 2024-09-10 NOTE — HISTORY OF PRESENT ILLNESS
[FreeTextEntry1] : 5 month f/u [de-identified] : BETTY ABREU is a 63 year old M with PMHx of anxiety, IVY on CPAP, asthma, OA, kidney stones, HTN, HLD, mitral valve prolapse GERD who presents today for 5 month follow up.  Asking for refill on meds Has worsening neuropathy and lower back pain seeing back surgeon jonathon is condiering surgery as injections not working. Went off wegovy for 2 weeks for eye surgery, stopped losing weight saw endo and changed to zepbound 10mg and gained 20s lbs since last visit. Patient feels well. Denies chest pain, sob, gallagher, dizziness, orthopnea, diaphoresis, palpitations, LE swelling, syncope, n/v, headache.

## 2024-09-10 NOTE — HEALTH RISK ASSESSMENT
[0] : 2) Feeling down, depressed, or hopeless: Not at all (0) [PHQ-2 Negative - No further assessment needed] : PHQ-2 Negative - No further assessment needed [Never] : Never [[id number]] : 0

## 2024-09-10 NOTE — HISTORY OF PRESENT ILLNESS
[FreeTextEntry1] : 5 month f/u [de-identified] : BETTY ABREU is a 63 year old M with PMHx of anxiety, IVY on CPAP, asthma, OA, kidney stones, HTN, HLD, mitral valve prolapse GERD who presents today for 5 month follow up.  Asking for refill on meds Has worsening neuropathy and lower back pain seeing back surgeon jonathon is condiering surgery as injections not working. Went off wegovy for 2 weeks for eye surgery, stopped losing weight saw endo and changed to zepbound 10mg and gained 20s lbs since last visit. Patient feels well. Denies chest pain, sob, gallagher, dizziness, orthopnea, diaphoresis, palpitations, LE swelling, syncope, n/v, headache.

## 2024-09-24 ENCOUNTER — RX RENEWAL (OUTPATIENT)
Age: 64
End: 2024-09-24

## 2024-10-07 ENCOUNTER — TRANSCRIPTION ENCOUNTER (OUTPATIENT)
Age: 64
End: 2024-10-07

## 2024-10-07 ENCOUNTER — DOCTOR'S OFFICE (OUTPATIENT)
Facility: LOCATION | Age: 64
Setting detail: OPHTHALMOLOGY
End: 2024-10-07
Payer: COMMERCIAL

## 2024-10-07 DIAGNOSIS — Q14.1: ICD-10-CM

## 2024-10-07 DIAGNOSIS — H02.834: ICD-10-CM

## 2024-10-07 DIAGNOSIS — H02.831: ICD-10-CM

## 2024-10-07 DIAGNOSIS — H53.2: ICD-10-CM

## 2024-10-07 DIAGNOSIS — H25.13: ICD-10-CM

## 2024-10-07 DIAGNOSIS — H53.40: ICD-10-CM

## 2024-10-07 DIAGNOSIS — H40.013: ICD-10-CM

## 2024-10-07 DIAGNOSIS — H02.132: ICD-10-CM

## 2024-10-07 DIAGNOSIS — H35.413: ICD-10-CM

## 2024-10-07 DIAGNOSIS — H02.135: ICD-10-CM

## 2024-10-07 PROCEDURE — 76514 ECHO EXAM OF EYE THICKNESS: CPT | Performed by: OPHTHALMOLOGY

## 2024-10-07 PROCEDURE — 92250 FUNDUS PHOTOGRAPHY W/I&R: CPT | Performed by: OPHTHALMOLOGY

## 2024-10-07 PROCEDURE — 92014 COMPRE OPH EXAM EST PT 1/>: CPT | Performed by: OPHTHALMOLOGY

## 2024-10-07 ASSESSMENT — REFRACTION_CURRENTRX
OD_AXIS: 11
OD_VPRISM_DIRECTION: PROGS
OS_AXIS: 004
OD_ADD: +2.50
OS_CYLINDER: +0.50
OS_OVR_VA: 20/
OD_OVR_VA: 20/
OS_ADD: +2.50
OS_VPRISM_DIRECTION: PROGS
OD_SPHERE: -0.50
OD_OVR_VA: 20/
OD_CYLINDER: +0.50
OS_SPHERE: -0.50
OS_OVR_VA: 20/

## 2024-10-07 ASSESSMENT — CONFRONTATIONAL VISUAL FIELD TEST (CVF)
OS_FINDINGS: FULL
OD_FINDINGS: FULL

## 2024-10-07 ASSESSMENT — LID POSITION - DERMATOCHALASIS
OD_DERMATOCHALASIS: RUL 2+
OS_DERMATOCHALASIS: LUL 2+

## 2024-10-07 ASSESSMENT — REFRACTION_AUTOREFRACTION
OD_AXIS: 090
OD_AXIS: 100
OS_AXIS: 078
OD_SPHERE: +0.25
OD_CYLINDER: -0.75
OS_CYLINDER: -0.75
OS_SPHERE: PLANO
OS_CYLINDER: +0.50
OD_CYLINDER: -1.00
OS_AXIS: 084
OS_SPHERE: 0.00
OD_SPHERE: +0.50

## 2024-10-07 ASSESSMENT — REFRACTION_MANIFEST
OD_AXIS: 005
OS_CYLINDER: +1.00
OD_CYLINDER: +1.75
OS_AXIS: 165
OD_ADD: +2.50
OS_VA1: 20/20
OS_ADD: +2.50
OD_VA1: 20/20
OD_SPHERE: -0.25
OS_SPHERE: -0.50

## 2024-10-07 ASSESSMENT — REFRACTION_TRIALFRAME
OD_AXIS: 090
OS_SPHERE: PLANO
OS_CYLINDER: -0.50
OD_VA1: 20/20
OS_AXIS: 080
OD_ADD: +1.25
OS_ADD: +1.25
OD_SPHERE: +0.50
OS_VA1: 20/20
OD_CYLINDER: -1.00

## 2024-10-07 ASSESSMENT — PACHYMETRY
OD_CT_CORRECTION: -5
OD_CT_UM: 611
OS_CT_UM: 618
OS_CT_CORRECTION: -5

## 2024-10-07 ASSESSMENT — KERATOMETRY
OS_K1POWER_DIOPTERS: 43.50
OS_K2POWER_DIOPTERS: 43.75
OD_K2POWER_DIOPTERS: 43.25
OD_AXISANGLE_DEGREES: 116
OS_AXISANGLE_DEGREES: 034
OD_K1POWER_DIOPTERS: 43.00

## 2024-10-07 ASSESSMENT — LID POSITION - ECTROPION
OD_ECTROPION: RLL 1+
OS_ECTROPION: LLL 1+

## 2024-10-07 ASSESSMENT — TONOMETRY
OD_IOP_MMHG: 20
OS_IOP_MMHG: 20

## 2024-10-07 ASSESSMENT — LID POSITION - COMMENTS
OS_COMMENTS: SCLERAL SHOW
OD_COMMENTS: SCLERAL SHOW

## 2024-10-07 ASSESSMENT — VISUAL ACUITY
OD_BCVA: 20/20
OS_BCVA: 20/20-1

## 2024-10-18 ENCOUNTER — NON-APPOINTMENT (OUTPATIENT)
Age: 64
End: 2024-10-18

## 2024-12-09 ENCOUNTER — APPOINTMENT (OUTPATIENT)
Dept: INTERNAL MEDICINE | Facility: CLINIC | Age: 64
End: 2024-12-09

## 2024-12-23 ENCOUNTER — APPOINTMENT (OUTPATIENT)
Dept: PULMONOLOGY | Facility: CLINIC | Age: 64
End: 2024-12-23
Payer: COMMERCIAL

## 2024-12-23 VITALS
SYSTOLIC BLOOD PRESSURE: 129 MMHG | OXYGEN SATURATION: 95 % | WEIGHT: 285 LBS | DIASTOLIC BLOOD PRESSURE: 83 MMHG | HEART RATE: 88 BPM | BODY MASS INDEX: 36.59 KG/M2

## 2024-12-23 DIAGNOSIS — G47.33 OBSTRUCTIVE SLEEP APNEA (ADULT) (PEDIATRIC): ICD-10-CM

## 2024-12-23 DIAGNOSIS — J45.909 UNSPECIFIED ASTHMA, UNCOMPLICATED: ICD-10-CM

## 2024-12-23 PROCEDURE — 94729 DIFFUSING CAPACITY: CPT

## 2024-12-23 PROCEDURE — 94727 GAS DIL/WSHOT DETER LNG VOL: CPT

## 2024-12-23 PROCEDURE — 94010 BREATHING CAPACITY TEST: CPT

## 2024-12-23 PROCEDURE — 95012 NITRIC OXIDE EXP GAS DETER: CPT

## 2024-12-23 PROCEDURE — 99214 OFFICE O/P EST MOD 30 MIN: CPT | Mod: 25

## 2024-12-23 PROCEDURE — ZZZZZ: CPT

## 2025-01-27 ENCOUNTER — RX RENEWAL (OUTPATIENT)
Age: 65
End: 2025-01-27

## 2025-02-28 RX ORDER — LEVALBUTEROL TARTRATE 45 UG/1
45 AEROSOL, METERED ORAL
Qty: 3 | Refills: 3 | Status: ACTIVE | COMMUNITY
Start: 2025-02-28 | End: 1900-01-01

## 2025-03-13 ENCOUNTER — APPOINTMENT (OUTPATIENT)
Dept: INTERNAL MEDICINE | Facility: CLINIC | Age: 65
End: 2025-03-13
Payer: COMMERCIAL

## 2025-03-13 VITALS
BODY MASS INDEX: 34.78 KG/M2 | WEIGHT: 271 LBS | SYSTOLIC BLOOD PRESSURE: 120 MMHG | HEART RATE: 93 BPM | HEIGHT: 74 IN | DIASTOLIC BLOOD PRESSURE: 78 MMHG | OXYGEN SATURATION: 96 % | TEMPERATURE: 98.2 F

## 2025-03-13 DIAGNOSIS — E78.5 HYPERLIPIDEMIA, UNSPECIFIED: ICD-10-CM

## 2025-03-13 DIAGNOSIS — I10 ESSENTIAL (PRIMARY) HYPERTENSION: ICD-10-CM

## 2025-03-13 DIAGNOSIS — Z12.9 ENCOUNTER FOR SCREENING FOR MALIGNANT NEOPLASM, SITE UNSPECIFIED: ICD-10-CM

## 2025-03-13 DIAGNOSIS — R91.1 SOLITARY PULMONARY NODULE: ICD-10-CM

## 2025-03-13 DIAGNOSIS — R73.03 PREDIABETES.: ICD-10-CM

## 2025-03-13 DIAGNOSIS — D64.9 ANEMIA, UNSPECIFIED: ICD-10-CM

## 2025-03-13 DIAGNOSIS — Z13.228 ENCOUNTER FOR SCREENING FOR OTHER METABOLIC DISORDERS: ICD-10-CM

## 2025-03-13 DIAGNOSIS — G47.33 OBSTRUCTIVE SLEEP APNEA (ADULT) (PEDIATRIC): ICD-10-CM

## 2025-03-13 DIAGNOSIS — R97.20 ELEVATED PROSTATE, SPECIFIC ANTIGEN [PSA]: ICD-10-CM

## 2025-03-13 DIAGNOSIS — K76.89 OTHER SPECIFIED DISEASES OF LIVER: ICD-10-CM

## 2025-03-13 DIAGNOSIS — E66.01 MORBID (SEVERE) OBESITY DUE TO EXCESS CALORIES: ICD-10-CM

## 2025-03-13 DIAGNOSIS — J45.909 UNSPECIFIED ASTHMA, UNCOMPLICATED: ICD-10-CM

## 2025-03-13 DIAGNOSIS — R79.89 OTHER SPECIFIED ABNORMAL FINDINGS OF BLOOD CHEMISTRY: ICD-10-CM

## 2025-03-13 DIAGNOSIS — M54.16 RADICULOPATHY, LUMBAR REGION: ICD-10-CM

## 2025-03-13 DIAGNOSIS — R94.39 ABNORMAL RESULT OF OTHER CARDIOVASCULAR FUNCTION STUDY: ICD-10-CM

## 2025-03-13 PROCEDURE — G2211 COMPLEX E/M VISIT ADD ON: CPT | Mod: NC

## 2025-03-13 PROCEDURE — 99214 OFFICE O/P EST MOD 30 MIN: CPT

## 2025-04-14 ENCOUNTER — RX RENEWAL (OUTPATIENT)
Age: 65
End: 2025-04-14

## 2025-05-19 ENCOUNTER — RX RENEWAL (OUTPATIENT)
Age: 65
End: 2025-05-19

## 2025-06-11 ENCOUNTER — APPOINTMENT (OUTPATIENT)
Dept: PEDIATRIC NEUROLOGY | Facility: CLINIC | Age: 65
End: 2025-06-11

## 2025-06-21 ENCOUNTER — NON-APPOINTMENT (OUTPATIENT)
Age: 65
End: 2025-06-21

## 2025-06-23 ENCOUNTER — TRANSCRIPTION ENCOUNTER (OUTPATIENT)
Age: 65
End: 2025-06-23

## 2025-06-23 ENCOUNTER — RX RENEWAL (OUTPATIENT)
Age: 65
End: 2025-06-23

## 2025-06-23 ENCOUNTER — APPOINTMENT (OUTPATIENT)
Dept: PULMONOLOGY | Facility: CLINIC | Age: 65
End: 2025-06-23
Payer: COMMERCIAL

## 2025-06-23 VITALS — HEART RATE: 82 BPM | SYSTOLIC BLOOD PRESSURE: 109 MMHG | DIASTOLIC BLOOD PRESSURE: 69 MMHG | OXYGEN SATURATION: 96 %

## 2025-06-23 PROCEDURE — ZZZZZ: CPT

## 2025-06-23 PROCEDURE — 99214 OFFICE O/P EST MOD 30 MIN: CPT | Mod: 25

## 2025-06-23 PROCEDURE — 94727 GAS DIL/WSHOT DETER LNG VOL: CPT

## 2025-06-23 PROCEDURE — 94729 DIFFUSING CAPACITY: CPT

## 2025-06-23 PROCEDURE — 94010 BREATHING CAPACITY TEST: CPT

## 2025-07-01 ENCOUNTER — APPOINTMENT (OUTPATIENT)
Dept: PULMONOLOGY | Facility: CLINIC | Age: 65
End: 2025-07-01
Payer: COMMERCIAL

## 2025-07-01 PROCEDURE — ZZZZZ: CPT

## 2025-07-14 ENCOUNTER — APPOINTMENT (OUTPATIENT)
Dept: PULMONOLOGY | Facility: CLINIC | Age: 65
End: 2025-07-14
Payer: COMMERCIAL

## 2025-07-14 VITALS — DIASTOLIC BLOOD PRESSURE: 72 MMHG | HEART RATE: 95 BPM | SYSTOLIC BLOOD PRESSURE: 106 MMHG | OXYGEN SATURATION: 95 %

## 2025-07-14 PROCEDURE — 99213 OFFICE O/P EST LOW 20 MIN: CPT

## 2025-07-14 PROCEDURE — G2211 COMPLEX E/M VISIT ADD ON: CPT | Mod: NC

## 2025-07-23 ENCOUNTER — APPOINTMENT (OUTPATIENT)
Dept: OPHTHALMOLOGY | Facility: CLINIC | Age: 65
End: 2025-07-23
Payer: COMMERCIAL

## 2025-07-23 ENCOUNTER — NON-APPOINTMENT (OUTPATIENT)
Age: 65
End: 2025-07-23

## 2025-07-23 PROCEDURE — 92014 COMPRE OPH EXAM EST PT 1/>: CPT

## 2025-07-23 PROCEDURE — 92004 COMPRE OPH EXAM NEW PT 1/>: CPT

## 2025-07-23 PROCEDURE — 92250 FUNDUS PHOTOGRAPHY W/I&R: CPT

## (undated) DEVICE — TUBING FOR SMOKE EVACUATOR (PURPLE END)

## (undated) DEVICE — DRSG STERISTRIPS 0.5 X 4"

## (undated) DEVICE — SUT SILK 4-0 18" P-3

## (undated) DEVICE — SUT PROLENE 6-0 18" P-1

## (undated) DEVICE — GLV 6.5 PROTEXIS (BLUE)

## (undated) DEVICE — SUT PLAIN GUT 6-0 18" G-1

## (undated) DEVICE — SUT PROLENE 4-0 30" RB-1

## (undated) DEVICE — FRAZIER SUCTION TIP 8FR

## (undated) DEVICE — APPLICATOR COTTON TIP 3" STERILE

## (undated) DEVICE — SUT VICRYL 7-0 18" TG140-8 DA

## (undated) DEVICE — ELCTR BVI ACCU-TEMP CAUTERY SHAFT FINE TIP 1/2"

## (undated) DEVICE — SOL BALANCE SALT 15ML

## (undated) DEVICE — SUT MERSILENE 5-0 18" S-14

## (undated) DEVICE — TUBING SUCTION 20FT

## (undated) DEVICE — VENODYNE/SCD SLEEVE CALF MEDIUM

## (undated) DEVICE — PACK VITRECTOMY

## (undated) DEVICE — GLV 6.5 PROTEXIS (WHITE)

## (undated) DEVICE — SUT MERSILENE 4-0 18" S-2

## (undated) DEVICE — WARMING BLANKET LOWER ADULT

## (undated) DEVICE — ELCTR REM POLYHESIVE ADULT PT RETURN 15FT

## (undated) DEVICE — ELCTR GROUNDING PAD ADULT COVIDIEN

## (undated) DEVICE — MARKING PEN W RULER

## (undated) DEVICE — BLADE SCALPEL SAFETYLOCK #15

## (undated) DEVICE — PROTECTOR CORNEAL BLUE ADULT

## (undated) DEVICE — ELCTR BIPOLAR CORD 12FT

## (undated) DEVICE — DRSG CURITY GAUZE SPONGE 4 X 4" 12-PLY